# Patient Record
Sex: FEMALE | URBAN - METROPOLITAN AREA
[De-identification: names, ages, dates, MRNs, and addresses within clinical notes are randomized per-mention and may not be internally consistent; named-entity substitution may affect disease eponyms.]

---

## 2017-01-05 PROBLEM — J35.01 CHRONIC TONSILLITIS: Status: ACTIVE | Noted: 2017-01-05

## 2017-01-05 PROBLEM — J34.3 HYPERTROPHY OF BOTH INFERIOR NASAL TURBINATES: Status: ACTIVE | Noted: 2017-01-05

## 2017-01-05 PROBLEM — R09.81 CHRONIC NASAL CONGESTION: Status: ACTIVE | Noted: 2017-01-05

## 2017-01-05 PROBLEM — J34.2 NASAL SEPTAL DEVIATION: Status: ACTIVE | Noted: 2017-01-05

## 2017-01-05 PROBLEM — J34.89 NASAL OBSTRUCTION: Status: ACTIVE | Noted: 2017-01-05

## 2017-01-05 PROBLEM — J32.9 CHRONIC SINUSITIS, UNSPECIFIED LOCATION: Status: ACTIVE | Noted: 2017-01-05

## 2017-01-05 PROBLEM — J30.1 SEASONAL ALLERGIC RHINITIS DUE TO POLLEN: Status: ACTIVE | Noted: 2017-01-05

## 2017-01-13 RX ORDER — NORETHINDRONE ACETATE AND ETHINYL ESTRADIOL AND FERROUS FUMARATE 1MG-20(24)
KIT ORAL
Qty: 28 TABLET | Refills: 11 | Status: SHIPPED | OUTPATIENT
Start: 2017-01-13 | End: 2017-03-06 | Stop reason: ALTCHOICE

## 2017-01-13 RX ORDER — NORETHINDRONE ACETATE AND ETHINYL ESTRADIOL AND FERROUS FUMARATE 1MG-20(24)
KIT ORAL
Qty: 28 TABLET | Refills: 0 | OUTPATIENT
Start: 2017-01-13

## 2017-01-13 NOTE — TELEPHONE ENCOUNTER
Last OV 3/11/16  Last refilled 3/29/16  #28  11 refills  Confirmed with pharmacy patient has no refills left

## 2017-01-13 NOTE — TELEPHONE ENCOUNTER
Last refilled 3/29/16  #28  11 refills    Spoke with [address] staff who states patient has refills available  Will ready for patient

## 2017-01-26 PROBLEM — J32.0 CHRONIC MAXILLARY SINUSITIS: Status: ACTIVE | Noted: 2017-01-26

## 2017-01-26 PROBLEM — J32.2 CHRONIC ETHMOIDAL SINUSITIS: Status: ACTIVE | Noted: 2017-01-26

## 2017-02-16 ENCOUNTER — TELEPHONE (OUTPATIENT)
Dept: FAMILY MEDICINE CLINIC | Facility: CLINIC | Age: 32
End: 2017-02-16

## 2017-02-16 ENCOUNTER — OFFICE VISIT (OUTPATIENT)
Dept: FAMILY MEDICINE CLINIC | Facility: CLINIC | Age: 32
End: 2017-02-16

## 2017-02-16 VITALS
DIASTOLIC BLOOD PRESSURE: 84 MMHG | HEART RATE: 80 BPM | SYSTOLIC BLOOD PRESSURE: 120 MMHG | WEIGHT: 182 LBS | TEMPERATURE: 98 F | BODY MASS INDEX: 28 KG/M2 | RESPIRATION RATE: 16 BRPM

## 2017-02-16 DIAGNOSIS — J40 BRONCHITIS: Primary | ICD-10-CM

## 2017-02-16 DIAGNOSIS — J32.9 CHRONIC SINUSITIS, UNSPECIFIED LOCATION: ICD-10-CM

## 2017-02-16 PROCEDURE — 99214 OFFICE O/P EST MOD 30 MIN: CPT | Performed by: FAMILY MEDICINE

## 2017-02-16 RX ORDER — AZITHROMYCIN 250 MG/1
TABLET, FILM COATED ORAL
Qty: 6 TABLET | Refills: 0 | Status: SHIPPED | OUTPATIENT
Start: 2017-02-16 | End: 2017-02-24 | Stop reason: ALTCHOICE

## 2017-02-16 NOTE — PROGRESS NOTES
Frederick Vega is a 36year old female. Patient presents with:  Sinus Problem: per pt    HPI:   Camryn Bennett presents to the office with complaints of chronic sinus infections, recurrent infections. Has been seeing ENT.  Going for another sinus surgery, but can' SURGERY          Family History   Problem Relation Age of Onset   • Ear Problems Father    • Allergies Father    • Anesthesia Problems  Mother    • Allergies Brother    • Thyroid disease Maternal Grandmother    • Ear Problems Maternal Grandfather    • Canc to return if symptoms persist or worsen.       #[id number]

## 2017-02-24 ENCOUNTER — OFFICE VISIT (OUTPATIENT)
Dept: FAMILY MEDICINE CLINIC | Facility: CLINIC | Age: 32
End: 2017-02-24

## 2017-02-24 VITALS
BODY MASS INDEX: 28 KG/M2 | DIASTOLIC BLOOD PRESSURE: 80 MMHG | SYSTOLIC BLOOD PRESSURE: 114 MMHG | WEIGHT: 184 LBS | RESPIRATION RATE: 16 BRPM | TEMPERATURE: 98 F | HEART RATE: 72 BPM | OXYGEN SATURATION: 98 %

## 2017-02-24 DIAGNOSIS — Z71.3 WEIGHT LOSS COUNSELING, ENCOUNTER FOR: Primary | ICD-10-CM

## 2017-02-24 PROCEDURE — 99214 OFFICE O/P EST MOD 30 MIN: CPT | Performed by: FAMILY MEDICINE

## 2017-02-24 RX ORDER — PHENTERMINE HYDROCHLORIDE 30 MG/1
30 CAPSULE ORAL EVERY MORNING
Qty: 30 CAPSULE | Refills: 1 | Status: SHIPPED
Start: 2017-02-24 | End: 2017-03-06 | Stop reason: SINTOL

## 2017-02-24 NOTE — PROGRESS NOTES
Brittani Joseph is a 36year old female. HPI:   Pt is here to discuss her weight. She reports she is in a better place mentally and physically.  Her confidence is better, her body image is better, she is in a more moderate place, finding exercise that sh REVIEW OF SYSTEMS:   GENERAL HEALTH: feels well otherwise  SKIN: denies any unusual skin lesions or rashes  RESPIRATORY: denies shortness of breath with exertion  CARDIOVASCULAR: denies chest pain on exertion  GI: denies abdominal pain/n/

## 2017-03-05 ENCOUNTER — HOSPITAL ENCOUNTER (OUTPATIENT)
Age: 32
Discharge: HOME | End: 2017-03-05
Payer: COMMERCIAL

## 2017-03-05 VITALS
HEIGHT: 68 IN | RESPIRATION RATE: 16 BRPM | OXYGEN SATURATION: 96 % | WEIGHT: 175 LBS | DIASTOLIC BLOOD PRESSURE: 89 MMHG | BODY MASS INDEX: 26.52 KG/M2 | SYSTOLIC BLOOD PRESSURE: 125 MMHG | TEMPERATURE: 98 F | HEART RATE: 90 BPM

## 2017-03-05 DIAGNOSIS — J32.1 CHRONIC FRONTAL SINUSITIS: ICD-10-CM

## 2017-03-05 DIAGNOSIS — H10.9 CONJUNCTIVITIS OF LEFT EYE, UNSPECIFIED CONJUNCTIVITIS TYPE: Primary | ICD-10-CM

## 2017-03-05 PROCEDURE — 99214 OFFICE O/P EST MOD 30 MIN: CPT

## 2017-03-05 PROCEDURE — 99213 OFFICE O/P EST LOW 20 MIN: CPT

## 2017-03-05 RX ORDER — CLARITHROMYCIN 500 MG/1
500 TABLET, COATED ORAL 2 TIMES DAILY
COMMUNITY
End: 2017-06-12 | Stop reason: ALTCHOICE

## 2017-03-05 RX ORDER — FLUTICASONE PROPIONATE 50 MCG
SPRAY, SUSPENSION (ML) NASAL DAILY
COMMUNITY
End: 2018-12-14 | Stop reason: ALTCHOICE

## 2017-03-05 RX ORDER — ERYTHROMYCIN 5 MG/G
1 OINTMENT OPHTHALMIC EVERY 6 HOURS
Qty: 1 G | Refills: 0 | Status: SHIPPED | OUTPATIENT
Start: 2017-03-05 | End: 2017-03-06 | Stop reason: ALTCHOICE

## 2017-03-05 RX ORDER — GARLIC EXTRACT 500 MG
1 CAPSULE ORAL DAILY
COMMUNITY
End: 2018-12-14 | Stop reason: ALTCHOICE

## 2017-03-05 NOTE — ED PROVIDER NOTES
Patient Seen in: [address] [facility]    History   Patient presents with:  Sinusitis  Eye Visual Problem (opthalmic)    Stated Complaint: sinus and pink eye-discharge    HPI    57-year-old female who presents to the immediate care with complai GRAMS EXT AA QID   Ketorolac Tromethamine 10 MG Oral Tab,  TK 1 T PO Q 6 H PRF PAIN   acyclovir (ZOVIRAX) 5 % External Ointment,  Apply to affected area 5x/day for 5 days prn for cold sore   Montelukast Sodium (SINGULAIR) 10 MG Oral Tab,  1 po 2 hours prio Constitutional: She is oriented to person, place, and time. She appears well-developed and well-nourished. No distress. HENT:   Head: Normocephalic and atraumatic.    Right Ear: External ear normal.   Left Ear: External ear normal.   Nose: Nose normal.

## 2017-03-05 NOTE — ED INITIAL ASSESSMENT (HPI)
Patient has chronic sinusitis and saw her ENT yesterday. They are planning to do surgery to help with this issue. She has been prescribed Biaxin by her ENT which she plans to start today. However, this morning she woke with pink eye.  Her sclera is red to h

## 2017-03-06 RX ORDER — NORETHINDRONE ACETATE AND ETHINYL ESTRADIOL 1; .02 MG/1; MG/1
1 TABLET ORAL DAILY
Qty: 1 PACKAGE | Refills: 11 | Status: SHIPPED | OUTPATIENT
Start: 2017-03-06 | End: 2017-06-12 | Stop reason: ALTCHOICE

## 2017-03-29 ENCOUNTER — PATIENT MESSAGE (OUTPATIENT)
Dept: FAMILY MEDICINE CLINIC | Facility: CLINIC | Age: 32
End: 2017-03-29

## 2017-03-29 RX ORDER — PHENTERMINE HYDROCHLORIDE 37.5 MG/1
37.5 TABLET ORAL
Qty: 30 TABLET | Refills: 1 | Status: SHIPPED
Start: 2017-03-29 | End: 2017-06-12 | Stop reason: ALTCHOICE

## 2017-03-29 NOTE — TELEPHONE ENCOUNTER
From: Humberto Gray  To: Yevette Severin, MD  Sent: 3/29/2017 10:12 AM CDT  Subject: Visit Follow-up Question    Hi Dr. Thaddeus Garvey,   I'm following up regarding the Phentermine. I don't feel like it is working anymore.  I'm so hungry all the time and I'm able to s

## 2017-05-26 RX ORDER — ACYCLOVIR 50 MG/G
OINTMENT TOPICAL
Qty: 15 G | Refills: 3 | Status: SHIPPED | OUTPATIENT
Start: 2017-05-26 | End: 2018-12-14 | Stop reason: ALTCHOICE

## 2017-05-26 RX ORDER — VALACYCLOVIR HYDROCHLORIDE 1 G/1
TABLET, FILM COATED ORAL
Qty: 16 TABLET | Refills: 0 | Status: SHIPPED | OUTPATIENT
Start: 2017-05-26 | End: 2018-12-14 | Stop reason: ALTCHOICE

## 2017-05-26 NOTE — TELEPHONE ENCOUNTER
LOV-2/24/2017  Last refill-   ValACYclovir HCl (VALTREX) 1 G Oral Tab 16 tablet 0 3/30/2016 4/6/2016     Sig :  Take 2 tablets (2,000 mg total) by mouth every 12 (twelve) hours.  X 2 doses as needed for each episode of cold sore start at onset of sx

## 2017-06-12 ENCOUNTER — OFFICE VISIT (OUTPATIENT)
Dept: FAMILY MEDICINE CLINIC | Facility: CLINIC | Age: 32
End: 2017-06-12

## 2017-06-12 VITALS
RESPIRATION RATE: 14 BRPM | BODY MASS INDEX: 27.25 KG/M2 | HEART RATE: 78 BPM | HEIGHT: 68 IN | TEMPERATURE: 100 F | WEIGHT: 179.81 LBS | DIASTOLIC BLOOD PRESSURE: 80 MMHG | SYSTOLIC BLOOD PRESSURE: 120 MMHG

## 2017-06-12 DIAGNOSIS — J32.2 CHRONIC ETHMOIDAL SINUSITIS: ICD-10-CM

## 2017-06-12 DIAGNOSIS — J34.89 NASAL OBSTRUCTION: ICD-10-CM

## 2017-06-12 DIAGNOSIS — J32.0 CHRONIC MAXILLARY SINUSITIS: Primary | ICD-10-CM

## 2017-06-12 DIAGNOSIS — J35.01 CHRONIC TONSILLITIS: ICD-10-CM

## 2017-06-12 DIAGNOSIS — F41.9 ANXIETY: ICD-10-CM

## 2017-06-12 DIAGNOSIS — Z01.818 PRE-OP EVALUATION: ICD-10-CM

## 2017-06-12 DIAGNOSIS — F45.22 BODY DYSMORPHIC DISORDER: ICD-10-CM

## 2017-06-12 DIAGNOSIS — J34.3 HYPERTROPHY OF BOTH INFERIOR NASAL TURBINATES: ICD-10-CM

## 2017-06-12 DIAGNOSIS — J34.2 NASAL SEPTAL DEVIATION: ICD-10-CM

## 2017-06-12 PROCEDURE — 99215 OFFICE O/P EST HI 40 MIN: CPT | Performed by: FAMILY MEDICINE

## 2017-06-12 NOTE — H&P
Chau Morgan is a 29year old female who presents for a pre-operative physical exam. Patient is to have turbinate reduction, deviated septum repair, T&A, Bilateral anterior ethmoidectomy, bilateral revision maxillary antrostomy (right possible left), to induced   • Allergic rhinitis           Past Surgical History    REMOVAL GALLBLADDER      SINUS SURGERY          Family History   Problem Relation Age of Onset   • Ear Problems Father    • Allergies Father    • Anesthesia Problems  Mother    • Allergies Br deferred  PSYCH: tearful discussing her symptoms  MUSCULOSKELETAL: FROM of extremities without defecits  EXTREMITIES: no cyanosis, clubbing or edema  NEURO: Oriented times three,cranial nerves are intact,motor and sensory are grossly intact    ASSESSMENT A

## 2017-06-13 ENCOUNTER — TELEPHONE (OUTPATIENT)
Dept: FAMILY MEDICINE CLINIC | Facility: CLINIC | Age: 32
End: 2017-06-13

## 2017-06-13 NOTE — TELEPHONE ENCOUNTER
----- Message from Hayden Lynch MD sent at 6/12/2017 11:24 PM CDT -----  Regarding: H&P  Please fax H&P to surgeon ENT dr Ricco shipman

## 2017-06-27 PROBLEM — J34.3 HYPERTROPHY OF BOTH INFERIOR NASAL TURBINATES: Status: RESOLVED | Noted: 2017-01-05 | Resolved: 2017-06-27

## 2017-06-30 PROBLEM — J34.89 NASAL OBSTRUCTION: Status: RESOLVED | Noted: 2017-01-05 | Resolved: 2017-06-30

## 2017-06-30 PROBLEM — J32.9 CHRONIC SINUSITIS, UNSPECIFIED LOCATION: Status: RESOLVED | Noted: 2017-01-05 | Resolved: 2017-06-30

## 2017-06-30 PROBLEM — J35.01 CHRONIC TONSILLITIS: Status: RESOLVED | Noted: 2017-01-05 | Resolved: 2017-06-30

## 2017-06-30 PROBLEM — R09.81 CHRONIC NASAL CONGESTION: Status: RESOLVED | Noted: 2017-01-05 | Resolved: 2017-06-30

## 2017-10-11 ENCOUNTER — TELEPHONE (OUTPATIENT)
Dept: FAMILY MEDICINE CLINIC | Facility: CLINIC | Age: 32
End: 2017-10-11

## 2017-10-11 NOTE — TELEPHONE ENCOUNTER
Received 83/64/67 vis fax, duplicate request for medical records. Nothing in system about 1st request. This is regarding disability eligibility. They request entire chart on pt from 2012 to present.  Sent onto [employer] today and made copy and sent to sca

## 2017-11-17 PROBLEM — J30.1 CHRONIC SEASONAL ALLERGIC RHINITIS DUE TO POLLEN: Status: ACTIVE | Noted: 2017-01-05

## 2017-12-08 ENCOUNTER — TELEPHONE (OUTPATIENT)
Dept: FAMILY MEDICINE CLINIC | Facility: CLINIC | Age: 32
End: 2017-12-08

## 2017-12-08 ENCOUNTER — NURSE ONLY (OUTPATIENT)
Dept: FAMILY MEDICINE CLINIC | Facility: CLINIC | Age: 32
End: 2017-12-08

## 2017-12-08 DIAGNOSIS — Z32.01 POSITIVE URINE PREGNANCY TEST: ICD-10-CM

## 2017-12-08 DIAGNOSIS — Z32.01 POSITIVE URINE PREGNANCY TEST: Primary | ICD-10-CM

## 2017-12-08 PROCEDURE — 84702 CHORIONIC GONADOTROPIN TEST: CPT | Performed by: FAMILY MEDICINE

## 2017-12-08 PROCEDURE — 36415 COLL VENOUS BLD VENIPUNCTURE: CPT | Performed by: FAMILY MEDICINE

## 2017-12-08 NOTE — TELEPHONE ENCOUNTER
Patient thinks she is pregnant. Wants to come in today and confirm before telling her . Please call back.

## 2017-12-08 NOTE — PROGRESS NOTES
Mint tube drawn from left arm with 23g butterfly needle x1.  Pt jena well      Pt is very anxious to get this result- she had 2 positive urine pregnancy test this morning  Today would have been the start of her menses- she is already taking a prenatal vitami

## 2017-12-09 ENCOUNTER — TELEPHONE (OUTPATIENT)
Dept: FAMILY MEDICINE CLINIC | Facility: CLINIC | Age: 32
End: 2017-12-09

## 2017-12-09 DIAGNOSIS — Z32.01 POSITIVE PREGNANCY TEST: Primary | ICD-10-CM

## 2017-12-09 NOTE — TELEPHONE ENCOUNTER
Pt called, was wondering if the results of the blood work from yesterday is back?    Please call pt at [phone]

## 2017-12-09 NOTE — TELEPHONE ENCOUNTER
The hcg is mildly elevated at 119. So it is positive, but given it is low I don't know if it's a very early pregnancy or non-viable or ?  Will want to check hcg again Monday to see if it's going up as expected

## 2017-12-09 NOTE — TELEPHONE ENCOUNTER
Patient notified. Patient verbalized understanding.   Appointment booked  Future Appointments  Date Time Provider Dieter Holland   12/11/2017 10:15 AM  [facility] Terri Palomo

## 2017-12-11 ENCOUNTER — NURSE ONLY (OUTPATIENT)
Dept: FAMILY MEDICINE CLINIC | Facility: CLINIC | Age: 32
End: 2017-12-11

## 2017-12-11 ENCOUNTER — TELEPHONE (OUTPATIENT)
Dept: FAMILY MEDICINE CLINIC | Facility: CLINIC | Age: 32
End: 2017-12-11

## 2017-12-11 DIAGNOSIS — Z32.01 POSITIVE PREGNANCY TEST: ICD-10-CM

## 2017-12-11 PROCEDURE — 36415 COLL VENOUS BLD VENIPUNCTURE: CPT | Performed by: FAMILY MEDICINE

## 2017-12-11 PROCEDURE — 84702 CHORIONIC GONADOTROPIN TEST: CPT | Performed by: FAMILY MEDICINE

## 2017-12-12 NOTE — TELEPHONE ENCOUNTER
Called the pt and advised of the test results- she v/u      Notes Recorded by Sam Cramer MD on 12/11/2017 at 9:30 PM CST  Please notify pt of good news, her hcg has increased to 423, a reassuring sign of a viable, early pregnancy. Congratulations!  If n

## 2017-12-12 NOTE — TELEPHONE ENCOUNTER
Message   Received: Today   Message Contents   Antonieta Distance sent to Meredith Gómez, [facility] Nurse   Caller: self (Today, 10:52 AM)             Pt returning nurses call about results. Please call back.

## 2018-03-16 ENCOUNTER — TELEPHONE (OUTPATIENT)
Dept: FAMILY MEDICINE CLINIC | Facility: CLINIC | Age: 33
End: 2018-03-16

## 2018-03-16 NOTE — TELEPHONE ENCOUNTER
I avoid prednisone if at all possible in pregnancy--it's not an absolute contraindication, but I'd prefer she try ibuprofen first (also has some risks in pregnancy) 600mg TID x 3-4 days with food and if it helps go to BID for 3 days then qd x 3days then st

## 2018-03-16 NOTE — TELEPHONE ENCOUNTER
PT CALLED AND ADV THAT SHE IS 18 WEEKS PREGNANT. PT HAS A BICEP AND BURSITIS FLARE UP. NOT ABLE TO GET A HOLD OF OBGYN     WONDERING IF SHE CAN TAKE PREDNISONE.     IF SO CAN SOME BE CALLED IN    Naveen Crowley

## 2018-03-16 NOTE — TELEPHONE ENCOUNTER
Saw chiropractor and apparently this time the flare is much worse. Currently 18 weeks pregnant. Receiving US, manipulation, to start massage therapy Monday. He is telling her she is headed for a frozen shoulder.  The only thing she is taking for pain is tyl

## 2018-10-15 ENCOUNTER — TELEPHONE (OUTPATIENT)
Dept: FAMILY MEDICINE CLINIC | Facility: CLINIC | Age: 33
End: 2018-10-15

## 2018-10-15 NOTE — TELEPHONE ENCOUNTER
Daughter has an appointment with Dr Rowdy Johnston tomorrow at 11:15 am. Mom wants to know if she can get her flu shot tomorrow during her daughter's OV with Dr Rowdy Johnston. Patient has not seen Dr Rowdy Johnston since 06/12/2017.  Please call patient back to advise if she can shecul

## 2018-10-16 ENCOUNTER — IMMUNIZATION (OUTPATIENT)
Dept: FAMILY MEDICINE CLINIC | Facility: CLINIC | Age: 33
End: 2018-10-16
Payer: COMMERCIAL

## 2018-10-16 DIAGNOSIS — Z23 NEED FOR VACCINATION: ICD-10-CM

## 2018-10-16 PROCEDURE — 90686 IIV4 VACC NO PRSV 0.5 ML IM: CPT | Performed by: FAMILY MEDICINE

## 2018-10-16 PROCEDURE — 90471 IMMUNIZATION ADMIN: CPT | Performed by: FAMILY MEDICINE

## 2018-12-14 ENCOUNTER — OFFICE VISIT (OUTPATIENT)
Dept: FAMILY MEDICINE CLINIC | Facility: CLINIC | Age: 33
End: 2018-12-14
Payer: COMMERCIAL

## 2018-12-14 VITALS
TEMPERATURE: 98 F | HEIGHT: 68 IN | HEART RATE: 83 BPM | RESPIRATION RATE: 16 BRPM | BODY MASS INDEX: 30.31 KG/M2 | WEIGHT: 200 LBS | OXYGEN SATURATION: 99 % | DIASTOLIC BLOOD PRESSURE: 84 MMHG | SYSTOLIC BLOOD PRESSURE: 120 MMHG

## 2018-12-14 DIAGNOSIS — E66.9 OBESITY (BMI 30-39.9): ICD-10-CM

## 2018-12-14 DIAGNOSIS — Z71.3 WEIGHT LOSS COUNSELING, ENCOUNTER FOR: Primary | ICD-10-CM

## 2018-12-14 DIAGNOSIS — K59.09 OTHER CONSTIPATION: ICD-10-CM

## 2018-12-14 DIAGNOSIS — F43.23 ADJUSTMENT DISORDER WITH MIXED ANXIETY AND DEPRESSED MOOD: ICD-10-CM

## 2018-12-14 PROCEDURE — 99215 OFFICE O/P EST HI 40 MIN: CPT | Performed by: FAMILY MEDICINE

## 2018-12-14 RX ORDER — NORETHINDRONE ACETATE AND ETHINYL ESTRADIOL, ETHINYL ESTRADIOL AND FERROUS FUMARATE 1MG-10(24)
KIT ORAL
Refills: 4 | COMMUNITY
Start: 2018-12-04 | End: 2019-11-20

## 2018-12-14 RX ORDER — SERTRALINE HYDROCHLORIDE 25 MG/1
25 TABLET, FILM COATED ORAL DAILY
COMMUNITY
Start: 2018-12-12 | End: 2019-04-11 | Stop reason: ALTCHOICE

## 2018-12-14 RX ORDER — PHENTERMINE HYDROCHLORIDE 30 MG/1
30 CAPSULE ORAL EVERY MORNING
Qty: 30 CAPSULE | Refills: 1 | Status: SHIPPED
Start: 2018-12-14 | End: 2019-11-20

## 2018-12-14 NOTE — PROGRESS NOTES
Rosa Trevino is a 34year old female. HPI:   Patient her eto talk about losing weight.     She has been doing the [employer] program with Dr Rubi Quinn for the past few months, has lost 29# total sine giving birthing 4 months ago, 12# since doing the progr well otherwise  SKIN: denies any unusual skin lesions or rashes  RESPIRATORY: denies shortness of breath with exertion  CARDIOVASCULAR: denies chest pain on exertion  GI: denies abdominal pain and denies heartburn  NEURO: denies headaches    EXAM:

## 2018-12-19 ENCOUNTER — TELEPHONE (OUTPATIENT)
Dept: FAMILY MEDICINE CLINIC | Facility: CLINIC | Age: 33
End: 2018-12-19

## 2018-12-21 NOTE — TELEPHONE ENCOUNTER
Fax received from [employer] that the phentermine has been approved 30mg cap approved for 6 months through 6/19/19, REF# [id number]    Called [employer] to advise on the approval- the pt picked this up on 12/17/18- the prior auth did not go into effect until

## 2018-12-23 ENCOUNTER — HOSPITAL ENCOUNTER (OUTPATIENT)
Age: 33
Discharge: HOME | End: 2018-12-23
Payer: COMMERCIAL

## 2018-12-23 VITALS
OXYGEN SATURATION: 99 % | SYSTOLIC BLOOD PRESSURE: 121 MMHG | WEIGHT: 195 LBS | HEART RATE: 96 BPM | DIASTOLIC BLOOD PRESSURE: 83 MMHG | TEMPERATURE: 98 F | BODY MASS INDEX: 29.55 KG/M2 | HEIGHT: 68 IN | RESPIRATION RATE: 18 BRPM

## 2018-12-23 DIAGNOSIS — M54.50 LUMBAR BACK PAIN: Primary | ICD-10-CM

## 2018-12-23 PROCEDURE — 99214 OFFICE O/P EST MOD 30 MIN: CPT

## 2018-12-23 PROCEDURE — 96372 THER/PROPH/DIAG INJ SC/IM: CPT

## 2018-12-23 RX ORDER — KETOROLAC TROMETHAMINE 10 MG/1
10 TABLET, FILM COATED ORAL EVERY 6 HOURS PRN
Qty: 20 TABLET | Refills: 0 | Status: SHIPPED | OUTPATIENT
Start: 2018-12-23 | End: 2018-12-30

## 2018-12-23 RX ORDER — KETOROLAC TROMETHAMINE 30 MG/ML
60 INJECTION, SOLUTION INTRAMUSCULAR; INTRAVENOUS ONCE
Status: COMPLETED | OUTPATIENT
Start: 2018-12-23 | End: 2018-12-23

## 2018-12-23 RX ORDER — CYCLOBENZAPRINE HCL 10 MG
10 TABLET ORAL 3 TIMES DAILY PRN
Qty: 20 TABLET | Refills: 0 | Status: SHIPPED | OUTPATIENT
Start: 2018-12-23 | End: 2018-12-30

## 2018-12-23 NOTE — ED PROVIDER NOTES
Patient Seen in: [address] [facility]    History   Patient presents with:  Back Pain (musculoskeletal)    Stated Complaint: back pain    51-year-old female who presents to the immediate care with lower mid back pain for the past 4 days.   Tuan (36.6 °C)   Temp src Temporal   SpO2 99 %   O2 Device None (Room air)       Current:/83   Pulse 96   Temp 97.8 °F (36.6 °C) (Temporal)   Resp 18   Ht 172.7 cm (5' 8\")   Wt 88.5 kg   LMP 12/14/2018   SpO2 99%   BMI 29.65 kg/m²         Physical Exam Clinical Impression:  Lumbar back pain  (primary encounter diagnosis)    Disposition:  Discharge  12/23/2018 10:30 am    Follow-up:  Víctor Cortez MD  [address] [place] [address] [facility] [address] [phone]              Medications Prescribed:

## 2018-12-23 NOTE — ED INITIAL ASSESSMENT (HPI)
Low back pain for 4 days, woke up with mild low back pain that has progressively worsened. No injury this time. Has past injury 3 years ago with herniated disc, and gets flare ups but its not resolving.  Has 4mo old baby and thinks lifting and bending over

## 2019-01-09 ENCOUNTER — OFFICE VISIT (OUTPATIENT)
Dept: FAMILY MEDICINE CLINIC | Facility: CLINIC | Age: 34
End: 2019-01-09
Payer: COMMERCIAL

## 2019-01-09 VITALS
HEIGHT: 68 IN | HEART RATE: 92 BPM | RESPIRATION RATE: 18 BRPM | TEMPERATURE: 99 F | WEIGHT: 200 LBS | BODY MASS INDEX: 30.31 KG/M2 | SYSTOLIC BLOOD PRESSURE: 116 MMHG | DIASTOLIC BLOOD PRESSURE: 82 MMHG

## 2019-01-09 DIAGNOSIS — F51.02 ADJUSTMENT INSOMNIA: ICD-10-CM

## 2019-01-09 DIAGNOSIS — Z71.3 WEIGHT LOSS COUNSELING, ENCOUNTER FOR: ICD-10-CM

## 2019-01-09 DIAGNOSIS — E66.9 OBESITY (BMI 30-39.9): ICD-10-CM

## 2019-01-09 DIAGNOSIS — Z15.89 HLA B27 (HLA B27 POSITIVE): ICD-10-CM

## 2019-01-09 DIAGNOSIS — F43.23 ADJUSTMENT DISORDER WITH MIXED ANXIETY AND DEPRESSED MOOD: ICD-10-CM

## 2019-01-09 DIAGNOSIS — M51.26 LUMBAR DISC HERNIATION: ICD-10-CM

## 2019-01-09 DIAGNOSIS — G89.29 CHRONIC BILATERAL LOW BACK PAIN WITHOUT SCIATICA: Primary | ICD-10-CM

## 2019-01-09 DIAGNOSIS — M54.50 CHRONIC BILATERAL LOW BACK PAIN WITHOUT SCIATICA: Primary | ICD-10-CM

## 2019-01-09 LAB
BASOPHILS # BLD AUTO: 0.06 X10(3) UL (ref 0–0.1)
BASOPHILS NFR BLD AUTO: 0.8 %
CRP SERPL-MCNC: 0.45 MG/DL (ref ?–1)
EOSINOPHIL # BLD AUTO: 0.22 X10(3) UL (ref 0–0.3)
EOSINOPHIL NFR BLD AUTO: 3.1 %
ERYTHROCYTE [DISTWIDTH] IN BLOOD BY AUTOMATED COUNT: 13.1 % (ref 11.5–16)
HCT VFR BLD AUTO: 43.9 % (ref 34–50)
HGB BLD-MCNC: 14 G/DL (ref 12–16)
IMMATURE GRANULOCYTE COUNT: 0.01 X10(3) UL (ref 0–1)
IMMATURE GRANULOCYTE RATIO %: 0.1 %
LYMPHOCYTES # BLD AUTO: 2.54 X10(3) UL (ref 0.9–4)
LYMPHOCYTES NFR BLD AUTO: 35.7 %
MCH RBC QN AUTO: 27.7 PG (ref 27–33.2)
MCHC RBC AUTO-ENTMCNC: 31.9 G/DL (ref 31–37)
MCV RBC AUTO: 86.8 FL (ref 81–100)
MONOCYTES # BLD AUTO: 0.4 X10(3) UL (ref 0.1–1)
MONOCYTES NFR BLD AUTO: 5.6 %
NEUTROPHIL ABS PRELIM: 3.88 X10 (3) UL (ref 1.3–6.7)
NEUTROPHILS # BLD AUTO: 3.88 X10(3) UL (ref 1.3–6.7)
NEUTROPHILS NFR BLD AUTO: 54.7 %
PLATELET # BLD AUTO: 337 10(3)UL (ref 150–450)
RBC # BLD AUTO: 5.06 X10(6)UL (ref 3.8–5.1)
RED CELL DISTRIBUTION WIDTH-SD: 41 FL (ref 35.1–46.3)
SED RATE-ML: 6 MM/HR (ref 0–25)
WBC # BLD AUTO: 7.1 X10(3) UL (ref 4–13)

## 2019-01-09 PROCEDURE — 99214 OFFICE O/P EST MOD 30 MIN: CPT | Performed by: FAMILY MEDICINE

## 2019-01-09 PROCEDURE — 85025 COMPLETE CBC W/AUTO DIFF WBC: CPT | Performed by: FAMILY MEDICINE

## 2019-01-09 PROCEDURE — 86038 ANTINUCLEAR ANTIBODIES: CPT | Performed by: FAMILY MEDICINE

## 2019-01-09 PROCEDURE — 86812 HLA TYPING A B OR C: CPT | Performed by: FAMILY MEDICINE

## 2019-01-09 PROCEDURE — 36415 COLL VENOUS BLD VENIPUNCTURE: CPT | Performed by: FAMILY MEDICINE

## 2019-01-09 PROCEDURE — 85652 RBC SED RATE AUTOMATED: CPT | Performed by: FAMILY MEDICINE

## 2019-01-09 PROCEDURE — 86140 C-REACTIVE PROTEIN: CPT | Performed by: FAMILY MEDICINE

## 2019-01-09 RX ORDER — CYCLOBENZAPRINE HCL 10 MG
TABLET ORAL
COMMUNITY
End: 2019-11-20

## 2019-01-09 RX ORDER — BUPROPION HYDROCHLORIDE 150 MG/1
TABLET ORAL
Qty: 90 TABLET | Refills: 1 | Status: SHIPPED | OUTPATIENT
Start: 2019-01-09 | End: 2019-11-20

## 2019-01-09 RX ORDER — ALBUTEROL SULFATE 90 UG/1
2 AEROSOL, METERED RESPIRATORY (INHALATION) AS NEEDED
COMMUNITY

## 2019-01-09 RX ORDER — KETOROLAC TROMETHAMINE 10 MG/1
TABLET, FILM COATED ORAL
COMMUNITY
End: 2019-11-20

## 2019-01-09 RX ORDER — BUPROPION HYDROCHLORIDE 150 MG/1
150 TABLET ORAL DAILY
Qty: 30 TABLET | Refills: 1 | Status: SHIPPED | OUTPATIENT
Start: 2019-01-09 | End: 2019-01-09

## 2019-01-09 NOTE — PROGRESS NOTES
Preet Fam is a 34year old female. HPI:   Here to f/u from [facility] and ED visits 12/23 and 12/24.   STarted about a week earlier and had tried some flexeril she had at home (which usally helps) but didn't help, the ndid telemedicine through her insurance and Tablet 24 Hr TAKE 1 TABLET(150 MG) BY MOUTH DAILY Disp: 90 tablet Rfl: 1   Phentermine HCl 30 MG Oral Cap Take 1 capsule (30 mg total) by mouth every morning.  Disp: 30 capsule Rfl: 1        HISTORY:  Past Medical History:   Diagnosis Date   • Allergic rhin SED RATE, WESTERGREN (AUTOMATED); Future  -     C-REACTIVE PROTEIN; Future  -     KEISHA, DIRECT, REFLEX TO 9 ENAS; Future  -     CBC WITH DIFFERENTIAL WITH PLATELET; Future  -     MRI SI JOINTS(CPT=72730); Future  -     MRI SPINE LUMBAR (CPT=72334);  Future

## 2019-01-11 LAB
ANA SCREEN: NEGATIVE
HLA-B27: POSITIVE

## 2019-01-15 ENCOUNTER — TELEPHONE (OUTPATIENT)
Dept: FAMILY MEDICINE CLINIC | Facility: CLINIC | Age: 34
End: 2019-01-15

## 2019-01-15 NOTE — TELEPHONE ENCOUNTER
Ugh, sorry to hear about that. I don't see a reason to put it off, we'll want to get their opinion in near future here.   If she needs to talk to me I can call at end of day
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
## 2019-04-02 ENCOUNTER — OFFICE VISIT (OUTPATIENT)
Dept: FAMILY MEDICINE CLINIC | Facility: CLINIC | Age: 34
End: 2019-04-02
Payer: COMMERCIAL

## 2019-04-02 VITALS
HEART RATE: 95 BPM | RESPIRATION RATE: 16 BRPM | SYSTOLIC BLOOD PRESSURE: 106 MMHG | OXYGEN SATURATION: 97 % | DIASTOLIC BLOOD PRESSURE: 78 MMHG | TEMPERATURE: 98 F

## 2019-04-02 DIAGNOSIS — H00.022 HORDEOLUM INTERNUM OF RIGHT LOWER EYELID: Primary | ICD-10-CM

## 2019-04-02 PROCEDURE — 99213 OFFICE O/P EST LOW 20 MIN: CPT | Performed by: PHYSICIAN ASSISTANT

## 2019-04-02 RX ORDER — CETIRIZINE HYDROCHLORIDE 10 MG/1
10 TABLET ORAL DAILY
COMMUNITY
End: 2022-02-07 | Stop reason: ALTCHOICE

## 2019-04-02 RX ORDER — TOBRAMYCIN 3 MG/ML
SOLUTION/ DROPS OPHTHALMIC
Qty: 5 ML | Refills: 0 | Status: SHIPPED | OUTPATIENT
Start: 2019-04-02 | End: 2019-04-09

## 2019-04-02 NOTE — PROGRESS NOTES
CHIEF COMPLAINT:   Patient presents with:  Swelling: right eyelid swelling x last night. itchiness/pain. no redness/discharge.  slight congestion x this am. no cough/fever      HPI:   Preet Fam is a 30year old female who presents with chief complaint of Problems Father    • Allergies Father    • Anesthesia Problems  Mother    • Allergies Brother    • Thyroid disease Maternal Grandmother    • Ear Problems Maternal Grandfather    • Cancer Maternal Grandfather    • Bleeding Disorders Maternal Grandfather anticipated.    .     Requested Prescriptions     Signed Prescriptions Disp Refills   • Tobramycin Sulfate 0.3 % Ophthalmic Solution 5 mL 0     Si-2 gtts in affected eye q 4 hours while awake x 5-7 days       Risks, benefits, complications and side effe

## 2019-04-09 ENCOUNTER — PATIENT MESSAGE (OUTPATIENT)
Dept: FAMILY MEDICINE CLINIC | Facility: CLINIC | Age: 34
End: 2019-04-09

## 2019-04-10 NOTE — TELEPHONE ENCOUNTER
From: Donte Higginbotham  To: Brittani Franco MD  Sent: 4/9/2019 7:22 PM CDT  Subject: Prescription Question    Hi Dr. Vincent Brar,   I recently was experiencing bad side effects from taking the cymbalta.  I've been on 60mg for a while and was more fatigued, nauseous, re

## 2019-04-11 RX ORDER — DULOXETIN HYDROCHLORIDE 20 MG/1
40 CAPSULE, DELAYED RELEASE ORAL DAILY
Qty: 60 CAPSULE | Refills: 0 | Status: SHIPPED | OUTPATIENT
Start: 2019-04-11 | End: 2019-11-20

## 2019-04-12 RX ORDER — DULOXETIN HYDROCHLORIDE 20 MG/1
CAPSULE, DELAYED RELEASE ORAL
Qty: 180 CAPSULE | Refills: 0 | OUTPATIENT
Start: 2019-04-12

## 2019-04-12 NOTE — TELEPHONE ENCOUNTER
Duloxetine refilled 4/11/19 #60 with 0 refills. This request refused. See patient email from 4/9/19.

## 2019-04-13 ENCOUNTER — PATIENT MESSAGE (OUTPATIENT)
Dept: FAMILY MEDICINE CLINIC | Facility: CLINIC | Age: 34
End: 2019-04-13

## 2019-04-13 NOTE — TELEPHONE ENCOUNTER
From: Chelsey Ellis  To: Yevette Severin, MD  Sent: 4/13/2019 9:06 AM CDT  Subject: Non-Urgent Medical Question    Hi Dr. Mohamud Mcintyre was my first day taking the 40mg of cymbalta.  I tried to take it last night to avoid any side effects and immediately f

## 2019-05-03 ENCOUNTER — PATIENT MESSAGE (OUTPATIENT)
Dept: FAMILY MEDICINE CLINIC | Facility: CLINIC | Age: 34
End: 2019-05-03

## 2019-05-03 DIAGNOSIS — M53.3 SI (SACROILIAC) PAIN: ICD-10-CM

## 2019-05-03 DIAGNOSIS — M54.50 CHRONIC BILATERAL LOW BACK PAIN, UNSPECIFIED WHETHER SCIATICA PRESENT: Primary | ICD-10-CM

## 2019-05-03 DIAGNOSIS — G89.29 CHRONIC BILATERAL LOW BACK PAIN, UNSPECIFIED WHETHER SCIATICA PRESENT: Primary | ICD-10-CM

## 2019-05-03 NOTE — TELEPHONE ENCOUNTER
From: Rosa Trevino  To: Sanket Nicholson MD  Sent: 5/3/2019 1:09 PM CDT  Subject: Referral Request    Hi Dr. Олег Jurado,    I'm still having intense SI joint pain, now into my glutes and low back again.  I'd like to try dry needling to the muscles of the low back a

## 2019-05-15 ENCOUNTER — TELEPHONE (OUTPATIENT)
Dept: FAMILY MEDICINE CLINIC | Facility: CLINIC | Age: 34
End: 2019-05-15

## 2019-05-21 NOTE — TELEPHONE ENCOUNTER
[facility] plan of care received and signed and faxed back to [facility] [phone]

## 2019-05-24 ENCOUNTER — OFFICE VISIT (OUTPATIENT)
Dept: FAMILY MEDICINE CLINIC | Facility: CLINIC | Age: 34
End: 2019-05-24
Payer: COMMERCIAL

## 2019-05-24 VITALS
TEMPERATURE: 99 F | DIASTOLIC BLOOD PRESSURE: 70 MMHG | RESPIRATION RATE: 16 BRPM | HEART RATE: 93 BPM | OXYGEN SATURATION: 98 % | BODY MASS INDEX: 28.79 KG/M2 | WEIGHT: 190 LBS | HEIGHT: 68 IN | SYSTOLIC BLOOD PRESSURE: 122 MMHG

## 2019-05-24 DIAGNOSIS — J00 ACUTE NASOPHARYNGITIS (COMMON COLD): Primary | ICD-10-CM

## 2019-05-24 DIAGNOSIS — J02.9 SORE THROAT: ICD-10-CM

## 2019-05-24 PROCEDURE — 99213 OFFICE O/P EST LOW 20 MIN: CPT | Performed by: PHYSICIAN ASSISTANT

## 2019-05-24 PROCEDURE — 87081 CULTURE SCREEN ONLY: CPT | Performed by: PHYSICIAN ASSISTANT

## 2019-05-24 PROCEDURE — 87880 STREP A ASSAY W/OPTIC: CPT | Performed by: PHYSICIAN ASSISTANT

## 2019-05-24 RX ORDER — NORETHINDRONE ACETATE AND ETHINYL ESTRADIOL AND FERROUS FUMARATE 1MG-20(24)
KIT ORAL
Refills: 3 | COMMUNITY
Start: 2019-05-10 | End: 2021-10-21

## 2019-05-24 NOTE — PROGRESS NOTES
CHIEF COMPLAINT:   Patient presents with:  Sore Throat: congestion/cough x 2 days. no fever        HPI:   Justino Sotelo is a 30year old female presents to clinic with complaint of sore throat. Patient has had 2 days.  Symptoms have been worsening since on Drug use: No       REVIEW OF SYSTEMS:   GENERAL HEALTH: normal appetite  SKIN: Per HPI  HEENT: denies ear pain, See HPI  RESPIRATORY: denies shortness of breath or wheezing  CARDIOVASCULAR: denies chest pain or palpitations   GI: denies vomiting or diarr Follow up with PCP in 3-5 days if not improving, condition worsens, or fever greater than or equal to 100.4 persists for 72 hours. The patient/parent indicates understanding of these issues and agrees to the plan. Patient Instructions     1.   Rapid s · An over-the-counter anesthetic gargle  Use medicine for more relief  Over-the-counter medicine can reduce sore throat symptoms. Ask your pharmacist if you have questions about which medicine to use:  · Ease pain with anesthetic sprays.  Aspirin or an aspi

## 2019-05-24 NOTE — PATIENT INSTRUCTIONS
1.  Rapid strep negative, throat culture pending. We will contact you in 3 days with results (via mychart or phone). 2.  Mouthwash as prescribed for discomfort. Swish, gargle and spit out medication (do not swallow).   Check the temperature of foods and substitute also helps. Remember, never give aspirin to anyone 25 or younger, or if you are already taking blood thinners.   · For sore throats caused by allergies, try antihistamines to block the allergic reaction.   · Remember: unless a sore throat is caus

## 2019-05-26 ENCOUNTER — TELEPHONE (OUTPATIENT)
Dept: FAMILY MEDICINE CLINIC | Facility: CLINIC | Age: 34
End: 2019-05-26

## 2019-05-26 NOTE — TELEPHONE ENCOUNTER
sw patient, informed of neg strep culture.  Pt still has ST, advised OTC medications for pain relief and warm salt water gargles

## 2019-05-31 ENCOUNTER — TELEPHONE (OUTPATIENT)
Dept: FAMILY MEDICINE CLINIC | Facility: CLINIC | Age: 34
End: 2019-05-31

## 2019-06-01 ENCOUNTER — OFFICE VISIT (OUTPATIENT)
Dept: FAMILY MEDICINE CLINIC | Facility: CLINIC | Age: 34
End: 2019-06-01
Payer: COMMERCIAL

## 2019-06-01 VITALS
BODY MASS INDEX: 30 KG/M2 | RESPIRATION RATE: 16 BRPM | SYSTOLIC BLOOD PRESSURE: 114 MMHG | HEART RATE: 86 BPM | TEMPERATURE: 98 F | OXYGEN SATURATION: 99 % | DIASTOLIC BLOOD PRESSURE: 80 MMHG | WEIGHT: 194.38 LBS

## 2019-06-01 DIAGNOSIS — R05.9 COUGH: ICD-10-CM

## 2019-06-01 DIAGNOSIS — R09.81 SINUS CONGESTION: ICD-10-CM

## 2019-06-01 DIAGNOSIS — J02.9 SORE THROAT: Primary | ICD-10-CM

## 2019-06-01 PROCEDURE — 99213 OFFICE O/P EST LOW 20 MIN: CPT | Performed by: FAMILY MEDICINE

## 2019-06-01 PROCEDURE — 86308 HETEROPHILE ANTIBODY SCREEN: CPT | Performed by: FAMILY MEDICINE

## 2019-06-01 RX ORDER — GENTAMICIN SULFATE 3 MG/ML
SOLUTION/ DROPS OPHTHALMIC
COMMUNITY
Start: 2019-05-30 | End: 2022-01-19 | Stop reason: ALTCHOICE

## 2019-06-01 RX ORDER — AMOXICILLIN AND CLAVULANATE POTASSIUM 875; 125 MG/1; MG/1
1 TABLET, FILM COATED ORAL 2 TIMES DAILY
Qty: 20 TABLET | Refills: 0 | Status: SHIPPED | OUTPATIENT
Start: 2019-06-01 | End: 2019-06-11

## 2019-06-01 NOTE — PROGRESS NOTES
HPI:    Patient ID: Imelda Guzmán is a 30year old female. Patient presents with:  Sore Throat: per pt, sore throat      HPI  Patient is here for sore throat, cough and sinus congestion for 1 week.  Was seen at the [facility] last week, had strep culture which w Right arm, Patient Position: Sitting, Cuff Size: large)   Pulse 86   Temp 98 °F (36.7 °C) (Temporal)   Resp 16   Wt 194 lb 6.4 oz   SpO2 99%   BMI 29.56 kg/m²     Allergies:  Diazepam                RASH  Cardizem [Diltiazem*      Valium likely Bacterial sinusitis. Prescribed Augmentin. Advised to use OTC Flonase, 2 sprays each nostril qd for 10 days.  -     MONONUCLEOSIS TEST,SCREEN (IN-HOUSE)  -     Amoxicillin-Pot Clavulanate 875-125 MG Oral Tab;  Take 1 tablet by mouth 2 (two) times rae

## 2019-06-25 ENCOUNTER — TELEPHONE (OUTPATIENT)
Dept: FAMILY MEDICINE CLINIC | Facility: CLINIC | Age: 34
End: 2019-06-25

## 2019-08-28 RX ORDER — VALACYCLOVIR HYDROCHLORIDE 1 G/1
TABLET, FILM COATED ORAL
Qty: 4 TABLET | Refills: 0 | Status: SHIPPED | OUTPATIENT
Start: 2019-08-28 | End: 2020-01-14

## 2019-09-19 ENCOUNTER — TELEPHONE (OUTPATIENT)
Dept: FAMILY MEDICINE CLINIC | Facility: CLINIC | Age: 34
End: 2019-09-19

## 2019-09-19 DIAGNOSIS — Z23 NEED FOR VACCINATION: Primary | ICD-10-CM

## 2019-09-19 NOTE — TELEPHONE ENCOUNTER
Pt is coming in tomorrow for her flu shot. Per [facility] in pt message to get asap.

## 2019-09-20 ENCOUNTER — IMMUNIZATION (OUTPATIENT)
Dept: FAMILY MEDICINE CLINIC | Facility: CLINIC | Age: 34
End: 2019-09-20
Payer: COMMERCIAL

## 2019-09-20 ENCOUNTER — MED REC SCAN ONLY (OUTPATIENT)
Dept: FAMILY MEDICINE CLINIC | Facility: CLINIC | Age: 34
End: 2019-09-20

## 2019-09-20 DIAGNOSIS — Z23 NEED FOR VACCINATION: ICD-10-CM

## 2019-09-20 PROCEDURE — 90471 IMMUNIZATION ADMIN: CPT | Performed by: FAMILY MEDICINE

## 2019-09-20 PROCEDURE — 90686 IIV4 VACC NO PRSV 0.5 ML IM: CPT | Performed by: FAMILY MEDICINE

## 2019-10-10 ENCOUNTER — TELEPHONE (OUTPATIENT)
Dept: FAMILY MEDICINE CLINIC | Facility: CLINIC | Age: 34
End: 2019-10-10

## 2019-10-10 NOTE — TELEPHONE ENCOUNTER
PT IS ON VACATION AND HAS A REALLY BAD SINUS INFECTION AND IS TAKING OVER TRIP.     PT AWARE DR Aldrich Press OUT OF OFFICE BUT WOULD REALLY LIKE SOMETHING CALLED IN    [address]

## 2019-10-10 NOTE — TELEPHONE ENCOUNTER
Per DS, should be seen at [facility] in [place]. Patient notified and verbalized understanding.

## 2019-10-13 ENCOUNTER — HOSPITAL ENCOUNTER (OUTPATIENT)
Age: 34
Discharge: HOME | End: 2019-10-13
Payer: COMMERCIAL

## 2019-10-13 VITALS
DIASTOLIC BLOOD PRESSURE: 81 MMHG | OXYGEN SATURATION: 97 % | HEART RATE: 111 BPM | BODY MASS INDEX: 28.04 KG/M2 | HEIGHT: 68 IN | WEIGHT: 185 LBS | SYSTOLIC BLOOD PRESSURE: 126 MMHG | RESPIRATION RATE: 18 BRPM | TEMPERATURE: 98 F

## 2019-10-13 DIAGNOSIS — J01.01 ACUTE RECURRENT MAXILLARY SINUSITIS: Primary | ICD-10-CM

## 2019-10-13 DIAGNOSIS — B35.4 TINEA CORPORIS: ICD-10-CM

## 2019-10-13 PROCEDURE — 99213 OFFICE O/P EST LOW 20 MIN: CPT

## 2019-10-13 PROCEDURE — 99214 OFFICE O/P EST MOD 30 MIN: CPT

## 2019-10-13 RX ORDER — CLOTRIMAZOLE 1 %
CREAM (GRAM) TOPICAL
Qty: 45 G | Refills: 0 | Status: SHIPPED | OUTPATIENT
Start: 2019-10-13 | End: 2019-11-20

## 2019-10-13 RX ORDER — AMOXICILLIN AND CLAVULANATE POTASSIUM 875; 125 MG/1; MG/1
1 TABLET, FILM COATED ORAL 2 TIMES DAILY
Qty: 20 TABLET | Refills: 0 | Status: SHIPPED | OUTPATIENT
Start: 2019-10-13 | End: 2019-10-23

## 2019-10-13 NOTE — ED INITIAL ASSESSMENT (HPI)
Cough and congestion for over one week, started with sinus pressure and pain and now its worse in chest. Also rash to left side near bra line, not painful or itchy.  Did a telehealth visit and given amoxicillin to start, started on Thursday but not any impr

## 2019-10-13 NOTE — ED PROVIDER NOTES
Patient Seen in: [address] [facility]      History   Patient presents with:  Cough/URI    Stated Complaint: cold-like symptoms    HPI  Patient is a 62-year-old female past medical history of sinus surgery x2 in 2015 and 2017 and history of ast ill-appearing, toxic-appearing or diaphoretic. HENT:      Head:      Jaw: No trismus. Right Ear: Tympanic membrane and external ear normal.      Left Ear: Tympanic membrane and external ear normal.      Nose: Mucosal edema and rhinorrhea present. history of sinus surgery x2. Sinus pressure not responding to amoxicillin over the past 4 days. Advised patient to stop amoxicillin and start Augmentin. Continue to use Flonase.   Ibuprofen, acetaminophen, nasal lavage and warm compresses over sinuses to

## 2019-10-30 ENCOUNTER — TELEPHONE (OUTPATIENT)
Dept: FAMILY MEDICINE CLINIC | Facility: CLINIC | Age: 34
End: 2019-10-30

## 2019-10-30 RX ORDER — DOXYCYCLINE HYCLATE 100 MG/1
100 CAPSULE ORAL 2 TIMES DAILY
Qty: 14 CAPSULE | Refills: 0 | Status: SHIPPED | OUTPATIENT
Start: 2019-10-30 | End: 2019-11-06

## 2019-10-30 NOTE — TELEPHONE ENCOUNTER
Does hse feel sick? Tired, achy, run down? Any fevers/chills? If feeling \"sick\" I'd try another abx (if not breastfeeding doxy 100mg po bid x 7days), if not, could just be allergies. Is hse doing flonase or other nasal steroid spray?   Any decongestant

## 2019-10-30 NOTE — TELEPHONE ENCOUNTER
Pt is still coughing and has congestion- she finished the augmentin ~10/23 but only a little better  She wonders if it is related to changing allergy meds to xyzal.    Discharge from nose is green    Pt is asking for something else for this sinus/cough

## 2019-10-30 NOTE — TELEPHONE ENCOUNTER
Spoke to mom during her dtr's office visit.  Feeling sick again, tired, run down, head pressure, thick drainage, sent doxy, if this doesn't help may refer to ENT and/or mynor

## 2019-11-08 ENCOUNTER — OFFICE VISIT (OUTPATIENT)
Dept: FAMILY MEDICINE CLINIC | Facility: CLINIC | Age: 34
End: 2019-11-08
Payer: COMMERCIAL

## 2019-11-08 ENCOUNTER — TELEPHONE (OUTPATIENT)
Dept: FAMILY MEDICINE CLINIC | Facility: CLINIC | Age: 34
End: 2019-11-08

## 2019-11-08 VITALS
OXYGEN SATURATION: 100 % | RESPIRATION RATE: 18 BRPM | BODY MASS INDEX: 29 KG/M2 | SYSTOLIC BLOOD PRESSURE: 120 MMHG | TEMPERATURE: 99 F | DIASTOLIC BLOOD PRESSURE: 66 MMHG | WEIGHT: 189.81 LBS | HEART RATE: 74 BPM

## 2019-11-08 DIAGNOSIS — Z13.1 DIABETES MELLITUS SCREENING: ICD-10-CM

## 2019-11-08 DIAGNOSIS — J30.9 ALLERGIC SINUSITIS: ICD-10-CM

## 2019-11-08 DIAGNOSIS — R45.86 MOOD CHANGE: ICD-10-CM

## 2019-11-08 DIAGNOSIS — H02.845 SWELLING OF LEFT LOWER EYELID: ICD-10-CM

## 2019-11-08 DIAGNOSIS — H10.13 ALLERGIC CONJUNCTIVITIS OF BOTH EYES: Primary | ICD-10-CM

## 2019-11-08 DIAGNOSIS — Z13.220 LIPID SCREENING: ICD-10-CM

## 2019-11-08 DIAGNOSIS — Z71.3 WEIGHT LOSS COUNSELING, ENCOUNTER FOR: ICD-10-CM

## 2019-11-08 PROCEDURE — 80061 LIPID PANEL: CPT | Performed by: FAMILY MEDICINE

## 2019-11-08 PROCEDURE — 36415 COLL VENOUS BLD VENIPUNCTURE: CPT | Performed by: FAMILY MEDICINE

## 2019-11-08 PROCEDURE — 83036 HEMOGLOBIN GLYCOSYLATED A1C: CPT | Performed by: FAMILY MEDICINE

## 2019-11-08 PROCEDURE — 80050 GENERAL HEALTH PANEL: CPT | Performed by: FAMILY MEDICINE

## 2019-11-08 PROCEDURE — 99214 OFFICE O/P EST MOD 30 MIN: CPT | Performed by: FAMILY MEDICINE

## 2019-11-08 RX ORDER — METHYLPREDNISOLONE 4 MG/1
TABLET ORAL
Qty: 1 KIT | Refills: 0 | Status: SHIPPED | OUTPATIENT
Start: 2019-11-08 | End: 2019-11-20

## 2019-11-08 RX ORDER — OLOPATADINE HYDROCHLORIDE 2 MG/ML
1 SOLUTION/ DROPS OPHTHALMIC DAILY
Qty: 2.5 ML | Refills: 3 | Status: SHIPPED | OUTPATIENT
Start: 2019-11-08 | End: 2020-01-14

## 2019-11-08 NOTE — PROGRESS NOTES
HPI:   Lexx Lynn is a 30year old female who presents for upper respiratory symptoms for 30 days. Started with: sinus symptoms, treated with abx for sinusitis, nose mostly clear sometimes green,but doesn't feel as sick and def not 100%.     Now has: s Hr TAKE 1 TABLET(150 MG) BY MOUTH DAILY 90 tablet 1   • LO LOESTRIN FE 1 MG-10 MCG / 10 MCG Oral Tab TK 1 T PO D  4   • Phentermine HCl 30 MG Oral Cap Take 1 capsule (30 mg total) by mouth every morning.  30 capsule 1      Past Medical History:   Diagnosis exudate  NECK: supple,no adenopathy  LUNGS: clear to auscultation  CARDIO: RRR without murmur; well perfused    ASSESSMENT AND PLAN:   Gabriela Oliver is a 30year old female who presents with  Diagnoses and all orders for this visit:    Allergic conjunctivit Solution; Apply 1 drop to eye daily. Both eyes    call if no better in 3-4 days or if worsening symptoms.   The patient (and/or parents) indicates agreement and understanding

## 2019-11-08 NOTE — TELEPHONE ENCOUNTER
Left message for the pt that I can get her in on   Future Appointments   Date Time Provider Dieter Holland   11/20/2019 10:30 AM Natalia Mclaughlin MD [facility] EMG Starlene Libman     This is after her daughter's appt- advised to call if she can not make this appt
Pt would like to set up an appt for depression and also her follow up for today's appt. Nothing available until 12/2. Pt doesn't want to wait that long. Can we fit her in in the next 2 weeks for her follow up for her eyes and depression?   Please call p
Yes, can put her in witwh her dtr at upcoming visit, thanks
5
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
## 2019-11-08 NOTE — TELEPHONE ENCOUNTER
Spoke with the pt and advised of the need for an appt  She v/u  Offered her the 3pm and she is agreeable  appt scheduled  Future Appointments   Date Time Provider Dieter Holland   11/8/2019  3:00 PM Jacqui Guerrero MD [facility] EMG Shreyas Thao

## 2019-11-08 NOTE — TELEPHONE ENCOUNTER
Spoke with the pt and has been treated for sinusitits and this am noticed some swelling in the eyes. She has swelling around her eyes left is worse then the right.   Has gone down a little but but feels like she was punched in the face    Used a warm com

## 2019-11-20 ENCOUNTER — TELEPHONE (OUTPATIENT)
Dept: FAMILY MEDICINE CLINIC | Facility: CLINIC | Age: 34
End: 2019-11-20

## 2019-11-20 RX ORDER — SERTRALINE HYDROCHLORIDE 25 MG/1
TABLET, FILM COATED ORAL
Qty: 90 TABLET | Refills: 0 | Status: SHIPPED | OUTPATIENT
Start: 2019-11-20 | End: 2020-01-14

## 2019-11-20 NOTE — TELEPHONE ENCOUNTER
Spoke with the pt and advised of the notes from Dr. Joe Bingham- she v/u    Pt tells me that she sent a mychart to Dr. Joe Bingham about the [facility] d/o clinic advised that it was forwarded to Dr. Joe Bingham and we will look to find some recommendations for her- sh

## 2019-11-20 NOTE — PROGRESS NOTES
Raquel Razo is a 30year old female. HPI:   Patient here to follow-up on mood and weight. At our last visit I had asked her to keep a food journal and ask herself a few questions:    If I could only lose weight from a place of self love what would that START AT ONSET 4 tablet 0   • Albuterol Sulfate HFA (VENTOLIN HFA) 108 (90 Base) MCG/ACT Inhalation Aero Soln Inhale 2 puffs into the lungs as needed.           HISTORY:  Past Medical History:   Diagnosis Date   • Allergic rhinitis    • Extrinsic asthma, un therapist; restart sertraline 25mg to try to prevent panic; klonopin for prn use; reviewed risks including sedation and inability to drive on it. I discussed with the patient the use of benzodiazepines, it's sedating nature.  This medication is potentially

## 2019-11-20 NOTE — TELEPHONE ENCOUNTER
Needs sertraline filled to [facility]    Pt states that when she went home she looked and she doesn't have any left from when she was on it before.

## 2019-11-20 NOTE — TELEPHONE ENCOUNTER
Script sent 1 pill daily for 30 days, increase to 2 pills daily thereafter (unless she's happy with ehr symptoms at 1 pill, but usually end up needing 50mg dose or higher)

## 2020-01-14 ENCOUNTER — OFFICE VISIT (OUTPATIENT)
Dept: FAMILY MEDICINE CLINIC | Facility: CLINIC | Age: 35
End: 2020-01-14
Payer: COMMERCIAL

## 2020-01-14 VITALS
TEMPERATURE: 99 F | BODY MASS INDEX: 30 KG/M2 | SYSTOLIC BLOOD PRESSURE: 120 MMHG | HEART RATE: 80 BPM | RESPIRATION RATE: 14 BRPM | WEIGHT: 198.19 LBS | DIASTOLIC BLOOD PRESSURE: 80 MMHG

## 2020-01-14 DIAGNOSIS — Z71.3 WEIGHT LOSS COUNSELING, ENCOUNTER FOR: Primary | ICD-10-CM

## 2020-01-14 PROCEDURE — 99214 OFFICE O/P EST MOD 30 MIN: CPT | Performed by: FAMILY MEDICINE

## 2020-01-14 RX ORDER — METFORMIN HYDROCHLORIDE 500 MG/1
TABLET, EXTENDED RELEASE ORAL
Qty: 270 TABLET | Refills: 0 | OUTPATIENT
Start: 2020-01-14

## 2020-01-14 RX ORDER — METFORMIN HYDROCHLORIDE 500 MG/1
TABLET, EXTENDED RELEASE ORAL
Qty: 120 TABLET | Refills: 1 | Status: SHIPPED | OUTPATIENT
Start: 2020-01-14 | End: 2020-01-27 | Stop reason: SINTOL

## 2020-01-14 RX ORDER — VALACYCLOVIR HYDROCHLORIDE 1 G/1
TABLET, FILM COATED ORAL
Qty: 16 TABLET | Refills: 3 | Status: SHIPPED | OUTPATIENT
Start: 2020-01-14

## 2020-01-14 NOTE — PROGRESS NOTES
Shyla Powers is a 30year old female. HPI:   Patient here to f/u on mood, weight.     She is really working on herself, doing CBT with thearpy, is going well, and joined a group with a registered dietitian (annie briggs) to work on re-working her rela Brother    • Thyroid disease Maternal Grandmother    • Ear Problems Maternal Grandfather    • Cancer Maternal Grandfather    • Bleeding Disorders Maternal Grandfather    • Thyroid disease Maternal Aunt    • Thyroid disease Cousin       Social History    To HOURS FOR 2 DOSES AS NEEDED FOR EACH EPISODE OF COLD SORE.  START AT ONSET  -     metFORMIN HCl  MG Oral Tablet 24 Hr; 1 po qd x 1 week, then 2 po qd x 1 week, then 3 po qd x 1 week, then 4 po qd thereafter         The patient indicates understanding

## 2020-01-23 ENCOUNTER — PATIENT MESSAGE (OUTPATIENT)
Dept: FAMILY MEDICINE CLINIC | Facility: CLINIC | Age: 35
End: 2020-01-23

## 2020-01-24 NOTE — TELEPHONE ENCOUNTER
From: Yo Moreno  To: Miguel Ángel Waller MD  Sent: 1/23/2020 8:50 PM CST  Subject: Visit Follow-up Question    Hi Dr. Adriano Zhang,   I'm now on two pills daily of the metformin and I feel terrible.  The GI upset is causing so much bloating and pressure that it gets q

## 2020-01-27 RX ORDER — PHENTERMINE HYDROCHLORIDE 37.5 MG/1
37.5 CAPSULE ORAL EVERY MORNING
Qty: 30 CAPSULE | Refills: 1 | Status: SHIPPED | OUTPATIENT
Start: 2020-01-27 | End: 2021-12-01

## 2020-01-30 NOTE — Clinical Note
[facility]  [address] [address]   [facility] [place] [place] [address]  [phone]      Denton Lira :2004 MRN:[id number]    2020 Time Session Began: 1720  Time Session Ended: 1800    Session Type:45 Minute Therapy (71871)    Others Present: no    Intervention: Behavioral, Cognitive, Insight, Supportive    Suicide/Homicide/Violence Ideation: No    If Yes, explain:     Current Outpatient Medications   Medication Sig   • adapalene (DIFFERIN) 0.1 % gel Apply topically nightly.     No current facility-administered medications for this visit.        Change in Medication(s) Reported: No  If Yes, explain:     Patient/Family Education Provided: Yes  Patient/Family Displays Understanding: Yes    If No, explain:     Chief complaint in patient's own words: \"It (the divorce) is taking a long time.\"    Progress Note containing chief complaint and symptoms/problems related to the complaint:    (Data/Action/Response/Plan)    D: Pt discussed things being fine going in between his parent's homes, looking forward to mother being able to live in house vs an apartment after the divorce is finalized, not knowing much of what is happening with divorce court, wanting to live with his mother, still in an after school program despite getting A's, B's, and a C+ last quarter, mood fine, father's fault that mother's stressed, feeling like he doesn't belong at father's, parents have to use a court appointed phone and diana to communicate, but father doesn't do it, mother has a different phone for Pt due to father monitoring the other one, playing indoor soccer, having a couple good friends, getting annoyed with sister, especially when trying to do his school work, and worried that the court views him as bad related to him having run away from father's twice previously. Father brought Pt to session, but wasn't involved. Father said he needed to find somewhere to pay a bill here with  [employer], [place] it being after hours.  A: Pt presented friendly, and with fair mood and affect. Normalized his feelings. Discussed and encouraged that he is not at fault for his parent's issues, on assertively communicating and asking related to his school program and what is happening with the divorce, and to go elsewhere, like his room, if being annoyed by sister.  R: Pt hopes the divorce won't take too much longer.  P: Plan to continue psychotherapy, and to develop his Tx plan next session when a parent is available.    Need for Community Resources Assessed: Yes    Resources Needed: No    If Yes, what resources:     Diagnosis: F43.23 Adjustment disorder with mixed anxiety and depressed mood  (primary encounter diagnosis)    Treatment Plan: To be developed.    Discharge Plan: N/A    Next Appointment: Father said he will have mother call to schedule.  Shauna Driver LCSW

## 2020-01-31 ENCOUNTER — TELEPHONE (OUTPATIENT)
Dept: FAMILY MEDICINE CLINIC | Facility: CLINIC | Age: 35
End: 2020-01-31

## 2020-02-03 NOTE — TELEPHONE ENCOUNTER
Faxed appeal to [employer] - letter, last office visit and denial letter from [facility] to [phone]

## 2020-02-03 NOTE — TELEPHONE ENCOUNTER
That's fine with me. And to clarify, she has to have lost 5% of her body weight on her own first to qualify for phentermine?

## 2020-02-03 NOTE — TELEPHONE ENCOUNTER
PA has been denied the pt doesn't meet the criteria-   For a dx of weight loss the pt has to have lost 5% of body weight    Do you wish to change the indication for the medication to appetite suppression?

## 2020-02-03 NOTE — TELEPHONE ENCOUNTER
Called [facility] [phone] to check on this medication    So per Mark Barron @ [facility]  This medication had a previous PA from 12/2018-6/2019 so they consider this a continuation of the medication- that is why they are looking for the 5% weight loss  I explain

## 2020-02-04 NOTE — TELEPHONE ENCOUNTER
ben from [facility] called about the PA for this patient she wants to clarify if the pt had lost 5% of her body weight on this  advised that previously she had lost weight on the med but that was over 2 years ago    She v/u and will send this to the [employer]

## 2020-02-07 NOTE — TELEPHONE ENCOUNTER
Fax from [address] [place] [address] that the phentermine has been approved for 6 months through 8/6/2020    Case # [id number]      Pt notified via [employer]

## 2020-02-28 NOTE — TELEPHONE ENCOUNTER
Patient went to the [facility] on last Friday, Neg for strep. Nothing OTC is helping. Patient still has a really bad ST and can hardly swallow. What else can she do? Or does she need to be seen again?   Or Can she call something in for the ST?
She should probably get checked out again if she is not getting better at all and still that miserable. She might have ear infection vs. Throat or sinus infection.
Spoke with the pt and advised of the need to be seen again- she v/u we scheduled for tomorrow  Future Appointments   Date Time Provider Dieter Holland   6/1/2019  9:00 AM Emmy Casas MD [facility] [place]
Spoke with the pt and she is still c/o sore throat- swollen- she doesn' t have tonsils  Has some congestion, neck pain, some ear pain. Ibuprofen is not helping at all and tylenol  helps the symptoms very minimually.   She states that she feels like she
- - -

## 2020-03-23 ENCOUNTER — TELEPHONE (OUTPATIENT)
Dept: FAMILY MEDICINE CLINIC | Facility: CLINIC | Age: 35
End: 2020-03-23

## 2020-03-23 NOTE — TELEPHONE ENCOUNTER
Pt called, Having a real bad flare up with her right bicep tendonitis and left knee. Please call pt at [phone] to discuss.

## 2020-03-23 NOTE — TELEPHONE ENCOUNTER
Please find out who she ended up seeing for rheumatology and what the final diagnosis was? I don't see those records in here. The  rheumatologist treats autoimmune conditions and flare ups.

## 2020-03-23 NOTE — TELEPHONE ENCOUNTER
Pt states Dr. Sierra Rose for [facility]. Told pt she has Fibro- he put her on Cymbalta and she couldn't tolerate it and discontinued it. She felt it didn't do anything to help back pain. She states she saw another doctor and they told her it is AS. Could not

## 2020-03-23 NOTE — TELEPHONE ENCOUNTER
Mello. So a few things:    1) Need to find out who diagnosed the AS and when covid calms down re-establish care there.   This is not my area of expertise and need specialist to help monitor and manage long term (there are many treatment options they can pr

## 2020-03-23 NOTE — TELEPHONE ENCOUNTER
Patient states she has autoimmune issues and is having a flare up in her left knee and left biceps tendon. Taking diclofenac but it is not helping at all. States she can not bend or straighten knee. Knee is swollen and painful. No redness.     Intense

## 2020-03-23 NOTE — TELEPHONE ENCOUNTER
Patient states there has not been a definitive dx but pain doctor and surgeon think its a possibility. Derm does not. Surgeon recommended fusion but would need to do csect with subsequent deliveries so is doing pain management in the mean time.      Whit

## 2020-03-25 ENCOUNTER — TELEPHONE (OUTPATIENT)
Dept: FAMILY MEDICINE CLINIC | Facility: CLINIC | Age: 35
End: 2020-03-25

## 2020-03-25 RX ORDER — PREDNISONE 10 MG/1
TABLET ORAL
Qty: 24 TABLET | Refills: 0 | Status: SHIPPED | OUTPATIENT
Start: 2020-03-25 | End: 2020-04-16

## 2020-03-25 NOTE — TELEPHONE ENCOUNTER
Spoke with patient. Having \"random\" flares longstanding and typically stim and u/s therapy +/- cold laser at Dr. Negra Caicedo helps, but she can't go right now b/c of covid. Diclofenac and aleve not helping at all during this flare.     Has been flared for

## 2020-03-25 NOTE — TELEPHONE ENCOUNTER
Pt called she is still having pain in her left knee and right bicep. She is getting no relief from medication that she was advised to take. Pt is wanting to know what she should do next?  Pt Is unable to do her normal day to day activities bc of the pain

## 2020-04-14 ENCOUNTER — TELEPHONE (OUTPATIENT)
Dept: FAMILY MEDICINE CLINIC | Facility: CLINIC | Age: 35
End: 2020-04-14

## 2020-04-14 DIAGNOSIS — R52 BODY ACHES: Primary | ICD-10-CM

## 2020-04-14 PROCEDURE — 99212 OFFICE O/P EST SF 10 MIN: CPT | Performed by: FAMILY MEDICINE

## 2020-04-14 NOTE — TELEPHONE ENCOUNTER
Patient started with a fever and horrible horrible body aches. No cough, no short of breath, no diarrhea, vomiting or nausea. Patient tried to do an MD live visit and was told she needs to contact her PCP.  She also said she has been spotting for a few days

## 2020-04-14 NOTE — TELEPHONE ENCOUNTER
Patient states fever started today, 99.5  State she knows its not a true fever but she feels awful. States she has chills and shivering. States she is having pain mostly in her back,  but says she is having pain allover. No joint swelling.     No cough,

## 2020-04-14 NOTE — TELEPHONE ENCOUNTER
Virtual Telephone Check-In    Justino Sotelo verbally consents to a Virtual/Telephone Check-In visit on 04/14/20. Patient understands and accepts financial responsibility for any deductible, co-insurance and/or co-pays associated with this service.     Dur covid  -if severe persistent sob, if worst HA of life, if severe abdominal pain or persistent vomiting develop go to ER; o/w she'll call our office in 2-3 days with an update, sooner if questions/concerns  -otc tyleno/advil type products fine for body ache

## 2020-04-17 ENCOUNTER — TELEPHONE (OUTPATIENT)
Dept: FAMILY MEDICINE CLINIC | Facility: CLINIC | Age: 35
End: 2020-04-17

## 2020-04-17 NOTE — TELEPHONE ENCOUNTER
She was able to [facility] [address] she is negative they are do curbside testing

## 2020-04-17 NOTE — TELEPHONE ENCOUNTER
Agree with advice given. Based on the guidelines [facility] sent out this week her testing would be declined, so no need to bother.   Thanks

## 2020-04-17 NOTE — TELEPHONE ENCOUNTER
Patient states yesterday she did not have a fever and so far none today    CP beneath sternum. Feels it deep in her lungs. States it is not terrible. Used albuterol 2 times yesterday to help open up her lungs and she felt better. Still has zero energy.

## 2021-06-15 ENCOUNTER — TELEPHONE (OUTPATIENT)
Dept: FAMILY MEDICINE CLINIC | Facility: CLINIC | Age: 36
End: 2021-06-15

## 2021-06-15 DIAGNOSIS — Z23 NEED FOR VACCINATION: Primary | ICD-10-CM

## 2021-06-15 NOTE — TELEPHONE ENCOUNTER
Future Appointments   Date Time Provider Dieter Holland   6/17/2021  2:00 PM  [facility] [address] [facility] Flak[place] Mariscal

## 2021-06-15 NOTE — TELEPHONE ENCOUNTER
Order placed, thanks     Please verify that she is 3rd trimester pregnancy--she had Tdap in 2018 and typically don't do again this soon unless pregnant, so congrats if that's the case!

## 2021-06-17 ENCOUNTER — NURSE ONLY (OUTPATIENT)
Dept: FAMILY MEDICINE CLINIC | Facility: CLINIC | Age: 36
End: 2021-06-17
Payer: COMMERCIAL

## 2021-06-17 PROCEDURE — 90715 TDAP VACCINE 7 YRS/> IM: CPT | Performed by: FAMILY MEDICINE

## 2021-06-17 PROCEDURE — 90471 IMMUNIZATION ADMIN: CPT | Performed by: FAMILY MEDICINE

## 2021-06-17 NOTE — PROGRESS NOTES
Here for [place] Tdap vaccine. Tdap given in left deltoid. Patient tolerated well. Patient left in stable condition.

## 2021-08-21 ENCOUNTER — MED REC SCAN ONLY (OUTPATIENT)
Dept: FAMILY MEDICINE CLINIC | Facility: CLINIC | Age: 36
End: 2021-08-21

## 2021-10-21 ENCOUNTER — OFFICE VISIT (OUTPATIENT)
Dept: FAMILY MEDICINE CLINIC | Facility: CLINIC | Age: 36
End: 2021-10-21
Payer: COMMERCIAL

## 2021-10-21 VITALS
BODY MASS INDEX: 31.83 KG/M2 | SYSTOLIC BLOOD PRESSURE: 118 MMHG | WEIGHT: 210 LBS | HEIGHT: 68 IN | RESPIRATION RATE: 16 BRPM | HEART RATE: 69 BPM | OXYGEN SATURATION: 99 % | DIASTOLIC BLOOD PRESSURE: 76 MMHG | TEMPERATURE: 98 F

## 2021-10-21 DIAGNOSIS — F41.0 PANIC ATTACK: ICD-10-CM

## 2021-10-21 PROCEDURE — 84439 ASSAY OF FREE THYROXINE: CPT | Performed by: NURSE PRACTITIONER

## 2021-10-21 PROCEDURE — 82533 TOTAL CORTISOL: CPT | Performed by: NURSE PRACTITIONER

## 2021-10-21 PROCEDURE — 3008F BODY MASS INDEX DOCD: CPT | Performed by: NURSE PRACTITIONER

## 2021-10-21 PROCEDURE — 3078F DIAST BP <80 MM HG: CPT | Performed by: NURSE PRACTITIONER

## 2021-10-21 PROCEDURE — 3074F SYST BP LT 130 MM HG: CPT | Performed by: NURSE PRACTITIONER

## 2021-10-21 PROCEDURE — 99214 OFFICE O/P EST MOD 30 MIN: CPT | Performed by: NURSE PRACTITIONER

## 2021-10-21 PROCEDURE — 80050 GENERAL HEALTH PANEL: CPT | Performed by: NURSE PRACTITIONER

## 2021-10-21 PROCEDURE — 82306 VITAMIN D 25 HYDROXY: CPT | Performed by: NURSE PRACTITIONER

## 2021-10-21 RX ORDER — SERTRALINE HYDROCHLORIDE 25 MG/1
25 TABLET, FILM COATED ORAL DAILY
COMMUNITY
End: 2021-10-21 | Stop reason: DRUGHIGH

## 2021-10-21 RX ORDER — ALPRAZOLAM 0.5 MG/1
0.5 TABLET ORAL DAILY PRN
Qty: 15 TABLET | Refills: 0 | Status: SHIPPED | OUTPATIENT
Start: 2021-10-21

## 2021-10-21 NOTE — PATIENT INSTRUCTIONS
Increase sertraline to 50mg daily   Take alprazolam sparingly, may take a half pill. Keep in safe space. Do not take while operating heavy machinery, drinking alcohol etc.   Follow up with new PCP in 30 days for medication follow up and weight loss.    Labs

## 2021-10-24 NOTE — PROGRESS NOTES
Subjective:   Patient ID: Kayy Finnegan is a 31year old female. Patient presents to clinic today with concerns regarding her weight and mental health. She is approximately 13 weeks postpartum. States she has not gotten her menses back again.   Has wean coming more frequently and often. She denies any thoughts of hurting herself or others.            History/Other:   Review of Systems   Constitutional: Negative. Concerned about weight loss    Respiratory: Negative. Cardiovascular: Negative. Skin:     General: Skin is warm and dry. Neurological:      Mental Status: She is alert and oriented to person, place, and time.          Assessment & Plan:   BMI 30.0-30.9,adult  (primary encounter diagnosis)  Panic attack    Orders Placed This Encount

## 2021-11-24 ENCOUNTER — HOSPITAL ENCOUNTER (OUTPATIENT)
Age: 36
Discharge: HOME | End: 2021-11-24
Payer: COMMERCIAL

## 2021-11-24 ENCOUNTER — APPOINTMENT (OUTPATIENT)
Dept: GENERAL RADIOLOGY | Age: 36
End: 2021-11-24
Attending: NURSE PRACTITIONER
Payer: COMMERCIAL

## 2021-11-24 VITALS
RESPIRATION RATE: 16 BRPM | HEART RATE: 82 BPM | DIASTOLIC BLOOD PRESSURE: 82 MMHG | TEMPERATURE: 98 F | SYSTOLIC BLOOD PRESSURE: 123 MMHG | OXYGEN SATURATION: 98 %

## 2021-11-24 DIAGNOSIS — M77.50 TENDONITIS OF FOOT: ICD-10-CM

## 2021-11-24 DIAGNOSIS — S99.929A FOOT INJURY: Primary | ICD-10-CM

## 2021-11-24 PROCEDURE — 99213 OFFICE O/P EST LOW 20 MIN: CPT | Performed by: NURSE PRACTITIONER

## 2021-11-24 PROCEDURE — 73630 X-RAY EXAM OF FOOT: CPT | Performed by: NURSE PRACTITIONER

## 2021-11-24 RX ORDER — NAPROXEN 500 MG/1
500 TABLET ORAL 2 TIMES DAILY PRN
Qty: 20 TABLET | Refills: 0 | Status: SHIPPED | OUTPATIENT
Start: 2021-11-24 | End: 2021-12-01

## 2021-11-24 NOTE — ED INITIAL ASSESSMENT (HPI)
Pt with c/o left foot pain. Pt states last week was standing up from floor and stepped wrong.   Pain getting worse

## 2021-11-24 NOTE — TELEPHONE ENCOUNTER
LRF 5/26/17    LOV  6/1/19
PT CALLED AND ADV THAT SHE HAS A COLD SORE AND WOULD LIKE TO KNOW IF SOMETHING CAN BE CALLED IN    GENERIC VALTREX    [address]
Please let patient or care giver know or leave message that refills have been sent. Thanks.
Pt advised that med was sent- she v/u
English

## 2021-11-25 NOTE — ED PROVIDER NOTES
Patient Seen in: [facility]      History   Patient presents with:  Leg or Foot Injury    Stated Complaint: left foot pain    Subjective:   29-year-old female presents the IC with complaints of left plantar foot pain x1 week.   Patient dates she SpO2 98%         Physical Exam  GENERAL: well developed, well nourished,in no apparent distress  SKIN: no rashes,no suspicious lesions  HEENT: atraumatic, normocephalic,ears and throat are clear  NECK: supple,no adenopathy,no bruits  LUNGS: clear to ausc am    Follow-up:  Toma Matt MD  [facility] [phone]                Medications Prescribed:  Discharge Medication List as of 11/24/2021 11:31 AM    START taking these medications    naproxen 500 MG Oral

## 2021-12-01 ENCOUNTER — OFFICE VISIT (OUTPATIENT)
Dept: FAMILY MEDICINE CLINIC | Facility: CLINIC | Age: 36
End: 2021-12-01
Payer: COMMERCIAL

## 2021-12-01 VITALS
BODY MASS INDEX: 31 KG/M2 | SYSTOLIC BLOOD PRESSURE: 114 MMHG | WEIGHT: 207.13 LBS | TEMPERATURE: 97 F | DIASTOLIC BLOOD PRESSURE: 74 MMHG | HEART RATE: 84 BPM | RESPIRATION RATE: 16 BRPM | OXYGEN SATURATION: 99 %

## 2021-12-01 DIAGNOSIS — F32.A ANXIETY AND DEPRESSION: ICD-10-CM

## 2021-12-01 DIAGNOSIS — Z71.3 WEIGHT LOSS COUNSELING, ENCOUNTER FOR: ICD-10-CM

## 2021-12-01 DIAGNOSIS — F41.9 ANXIETY AND DEPRESSION: ICD-10-CM

## 2021-12-01 DIAGNOSIS — J45.990 EXERCISE-INDUCED ASTHMA: Primary | ICD-10-CM

## 2021-12-01 DIAGNOSIS — Z87.42: ICD-10-CM

## 2021-12-01 DIAGNOSIS — Z82.49 FHX: SVT (SUPRAVENTRICULAR TACHYCARDIA): ICD-10-CM

## 2021-12-01 DIAGNOSIS — Z86.79 HISTORY OF SUPRAVENTRICULAR TACHYCARDIA: ICD-10-CM

## 2021-12-01 DIAGNOSIS — E66.9 OBESITY (BMI 30-39.9): ICD-10-CM

## 2021-12-01 DIAGNOSIS — K92.1 BLOOD IN STOOL: ICD-10-CM

## 2021-12-01 PROCEDURE — 99214 OFFICE O/P EST MOD 30 MIN: CPT | Performed by: FAMILY MEDICINE

## 2021-12-01 PROCEDURE — 3074F SYST BP LT 130 MM HG: CPT | Performed by: FAMILY MEDICINE

## 2021-12-01 PROCEDURE — 3078F DIAST BP <80 MM HG: CPT | Performed by: FAMILY MEDICINE

## 2021-12-01 RX ORDER — PHENTERMINE HYDROCHLORIDE 15 MG/1
15 CAPSULE ORAL EVERY MORNING
Qty: 30 CAPSULE | Refills: 0 | Status: SHIPPED | OUTPATIENT
Start: 2021-12-01 | End: 2021-12-08

## 2021-12-01 RX ORDER — CHLORAL HYDRATE 500 MG
1 CAPSULE ORAL AS DIRECTED
COMMUNITY

## 2021-12-01 NOTE — PROGRESS NOTES
[facility] Family Medicine Office Note  Chief Complaint:   Patient presents with:  Medication Follow-Up: Sertraline increased from 25mg-50mg. Patient states medication increase is working very well.       HPI:   This is a 31year old female coming • Cancer Maternal Grandfather    • Bleeding Disorders Maternal Grandfather    • Thyroid disease Maternal Aunt    • Thyroid disease Cousin      Allergies:    Diazepam                RASH, HIVES, OTHER (SEE COMMENTS)  Cardizem [Diltiazem*      Valium reviewed. Appears stated age, well groomed.   Physical Exam     GEN: Not in any acute distress, making good conversation, answering appropriately   SKIN: No pallor, no erythema, no cyanosis, warm and dry  Eyes: wnl, normal conjunctiva   HEAD: Normocephalic, verbalized understanding and agreed with the plan. Patient was given the opportunity to ask questions. All questions were addressed, and patient voices no further concerns. Patient is in agreement with plan.  If the patient has any questions, the patient kn

## 2021-12-08 ENCOUNTER — PATIENT MESSAGE (OUTPATIENT)
Dept: FAMILY MEDICINE CLINIC | Facility: CLINIC | Age: 36
End: 2021-12-08

## 2021-12-08 RX ORDER — PHENTERMINE HYDROCHLORIDE 37.5 MG/1
37.5 TABLET ORAL
Qty: 30 TABLET | Refills: 0 | Status: SHIPPED | OUTPATIENT
Start: 2021-12-08 | End: 2022-01-07

## 2021-12-11 ENCOUNTER — PATIENT MESSAGE (OUTPATIENT)
Dept: FAMILY MEDICINE CLINIC | Facility: CLINIC | Age: 36
End: 2021-12-11

## 2021-12-11 NOTE — TELEPHONE ENCOUNTER
[facility] sent to pt regarding - inquiring if pt was able to  Rx  Routing to nurse pool for follow-up message read by pt and for pt response  Placed - Notify me if not read by: 12/18/2021

## 2022-01-19 ENCOUNTER — OFFICE VISIT (OUTPATIENT)
Dept: FAMILY MEDICINE CLINIC | Facility: CLINIC | Age: 37
End: 2022-01-19
Payer: COMMERCIAL

## 2022-01-19 VITALS
HEART RATE: 75 BPM | SYSTOLIC BLOOD PRESSURE: 116 MMHG | RESPIRATION RATE: 18 BRPM | DIASTOLIC BLOOD PRESSURE: 74 MMHG | TEMPERATURE: 98 F | OXYGEN SATURATION: 99 % | WEIGHT: 209.38 LBS | BODY MASS INDEX: 32 KG/M2

## 2022-01-19 DIAGNOSIS — K44.9 HIATAL HERNIA: Primary | ICD-10-CM

## 2022-01-19 DIAGNOSIS — Z90.49 HISTORY OF CHOLECYSTECTOMY: ICD-10-CM

## 2022-01-19 DIAGNOSIS — K21.9 GASTROESOPHAGEAL REFLUX DISEASE, UNSPECIFIED WHETHER ESOPHAGITIS PRESENT: ICD-10-CM

## 2022-01-19 DIAGNOSIS — R10.13 EPIGASTRIC PAIN: ICD-10-CM

## 2022-01-19 DIAGNOSIS — Z09 FOLLOW-UP EXAMINATION: ICD-10-CM

## 2022-01-19 DIAGNOSIS — K76.0 FATTY INFILTRATION OF LIVER: ICD-10-CM

## 2022-01-19 DIAGNOSIS — D72.825 BANDEMIA: ICD-10-CM

## 2022-01-19 PROCEDURE — 3074F SYST BP LT 130 MM HG: CPT | Performed by: FAMILY MEDICINE

## 2022-01-19 PROCEDURE — 99214 OFFICE O/P EST MOD 30 MIN: CPT | Performed by: FAMILY MEDICINE

## 2022-01-19 PROCEDURE — 3078F DIAST BP <80 MM HG: CPT | Performed by: FAMILY MEDICINE

## 2022-01-19 RX ORDER — PANTOPRAZOLE SODIUM 40 MG/1
TABLET, DELAYED RELEASE ORAL
COMMUNITY
Start: 2022-01-18

## 2022-01-19 NOTE — PROGRESS NOTES
[facility] Family Medicine Office Note  Chief Complaint:   Patient presents with:   Follow - Up: Weight loss f/u      HPI:   This is a 31year old female with some extensive issues with her stomach / and hx of cholecystectomy now coming in for fol Grandfather    • Thyroid disease Maternal Aunt    • Thyroid disease Cousin      Allergies:    Diazepam                RASH, HIVES, OTHER (SEE COMMENTS)  Cardizem [Diltiazem*      Valium                    Current Meds:  Current Outpatient Medications   Med Hiatal hernia  - GASTRO - INTERNAL    2. Gastroesophageal reflux disease, unspecified whether esophagitis present  - GASTRO - INTERNAL    3. Epigastric pain  - GASTRO - INTERNAL  Now resolved - pain is not bad at this time.  Discussed dietary modifications education done. Outcome: Patient verbalizes understanding. Patient is notified to call with any questions, complications, allergies, or worsening or changing symptoms.   Patient is to call with any side effects or complications from the treatments as a re

## 2022-01-19 NOTE — PATIENT INSTRUCTIONS
Avoid spicy foods, avoid caffeine, mints, chocolate, alcohol, tobacco, chewing gum. Avoid Ibuprofen, Advil, Motrin, Aleve, Aspirin. May use Tums or Maalox or other antacids for discomfort.     Elevate head of the bed 45°  Eat at least 2-3 hours prior taste in the mouth  · Trouble swallowing  Treating symptoms  If you have been diagnosed with a hiatal hernia, these tips may help ease symptoms:   · Lose excess weight. Extra weight puts pressure on the stomach and esophagus.   · Don’t lie down after eating

## 2022-02-07 ENCOUNTER — OFFICE VISIT (OUTPATIENT)
Dept: FAMILY MEDICINE CLINIC | Facility: CLINIC | Age: 37
End: 2022-02-07
Payer: COMMERCIAL

## 2022-02-07 ENCOUNTER — TELEPHONE (OUTPATIENT)
Dept: FAMILY MEDICINE CLINIC | Facility: CLINIC | Age: 37
End: 2022-02-07

## 2022-02-07 VITALS
HEART RATE: 77 BPM | RESPIRATION RATE: 16 BRPM | DIASTOLIC BLOOD PRESSURE: 84 MMHG | OXYGEN SATURATION: 99 % | WEIGHT: 202.13 LBS | TEMPERATURE: 97 F | SYSTOLIC BLOOD PRESSURE: 120 MMHG | BODY MASS INDEX: 31 KG/M2

## 2022-02-07 DIAGNOSIS — N64.4 BREAST PAIN, LEFT: Primary | ICD-10-CM

## 2022-02-07 DIAGNOSIS — S29.011A MUSCLE STRAIN OF CHEST WALL, INITIAL ENCOUNTER: ICD-10-CM

## 2022-02-07 PROCEDURE — 3074F SYST BP LT 130 MM HG: CPT | Performed by: FAMILY MEDICINE

## 2022-02-07 PROCEDURE — 3079F DIAST BP 80-89 MM HG: CPT | Performed by: FAMILY MEDICINE

## 2022-02-07 PROCEDURE — 99214 OFFICE O/P EST MOD 30 MIN: CPT | Performed by: FAMILY MEDICINE

## 2022-02-07 NOTE — TELEPHONE ENCOUNTER
LAURA gillette/ Dr. Sejal Morgan 02/07/2022 - for left breast pain    US left breast order placed    Mammogram dept requesting for Diagnostic mammogram    Order pending, please review.  Thank you

## 2022-02-07 NOTE — TELEPHONE ENCOUNTER
Discussed with Dr. Rigoberto Hinkle regarding note below - Dr. Rigoberto Hinkle to discuss with mammogram dept    No need for mammogram order at this time

## 2022-02-07 NOTE — TELEPHONE ENCOUNTER
Mammogram department from Hospital called stating pt needs an order for a diagnostic bilateral mammogram     Please place order    Call back # [phone]    Thank you

## 2022-02-07 NOTE — PATIENT INSTRUCTIONS
Pain / fullness in the L breast region. Clinically appears to be a strained pectoralis muscle. Postpartum 6 months and has had some engorgement of the breast tail with this baby. No fever or chills. No nausea or vomiting. Doing well overall and has been working out that could be straining her chest muscles. Ice the area at least 3-5 minutes each time and perform this at least 3 times per day. US of the L breast ordered to rule out any other abnormality.

## 2022-02-09 ENCOUNTER — HOSPITAL ENCOUNTER (OUTPATIENT)
Dept: MAMMOGRAPHY | Facility: HOSPITAL | Age: 37
Discharge: HOME | End: 2022-02-09
Attending: FAMILY MEDICINE
Payer: COMMERCIAL

## 2022-02-09 DIAGNOSIS — S29.011A MUSCLE STRAIN OF CHEST WALL, INITIAL ENCOUNTER: ICD-10-CM

## 2022-02-09 DIAGNOSIS — N64.4 BREAST PAIN, LEFT: ICD-10-CM

## 2022-02-09 PROCEDURE — 76642 ULTRASOUND BREAST LIMITED: CPT | Performed by: FAMILY MEDICINE

## 2022-03-09 NOTE — TELEPHONE ENCOUNTER
Last refilled 12/13/21 for #90 with 0 RF  LOV with MM 2/7/22  No future appt with pcp  Protocol: none

## 2022-03-25 ENCOUNTER — HOSPITAL ENCOUNTER (OUTPATIENT)
Age: 37
Discharge: HOME | End: 2022-03-25
Payer: COMMERCIAL

## 2022-03-25 VITALS
DIASTOLIC BLOOD PRESSURE: 102 MMHG | RESPIRATION RATE: 14 BRPM | TEMPERATURE: 98 F | SYSTOLIC BLOOD PRESSURE: 127 MMHG | HEART RATE: 96 BPM | OXYGEN SATURATION: 99 %

## 2022-03-25 DIAGNOSIS — J06.9 VIRAL URI: Primary | ICD-10-CM

## 2022-03-25 LAB — S PYO AG THROAT QL: NEGATIVE

## 2022-03-25 PROCEDURE — 87880 STREP A ASSAY W/OPTIC: CPT | Performed by: NURSE PRACTITIONER

## 2022-03-25 PROCEDURE — 99213 OFFICE O/P EST LOW 20 MIN: CPT | Performed by: NURSE PRACTITIONER

## 2022-06-08 NOTE — TELEPHONE ENCOUNTER
LOV 02/07/2022  Last refill on 03/09/2022, for #90 tabs, with 0 refills  SERTRALINE 50 MG Oral Tab    No future appointments. Order(s) pending, please review. Thank you.

## 2022-09-07 NOTE — TELEPHONE ENCOUNTER
Routing to provider per protocol. SERTRALINE 50 MG Oral Tab  Last refilled on 6/8/22 for #90  with 0 rf. Last labs 1/18/22. Last seen on 2/7/22. No future appointments. Thank you.

## 2022-09-30 ENCOUNTER — TELEPHONE (OUTPATIENT)
Dept: FAMILY MEDICINE CLINIC | Facility: CLINIC | Age: 37
End: 2022-09-30

## 2022-09-30 NOTE — TELEPHONE ENCOUNTER
Pt was a pt of [address] and is now a pt with MM. Pt has 2 daughters Giovanni Mueller tino 8/15/18 and Jennifer Mago 2021. Pt would like to know if MM would be willing to take her daughters on as pts as well. Both were pt's of [address]. Please advise.   Thank you

## 2022-10-04 ENCOUNTER — HOSPITAL ENCOUNTER (OUTPATIENT)
Age: 37
Discharge: HOME | End: 2022-10-04
Payer: COMMERCIAL

## 2022-10-04 VITALS
BODY MASS INDEX: 28.34 KG/M2 | DIASTOLIC BLOOD PRESSURE: 77 MMHG | HEIGHT: 68 IN | HEART RATE: 85 BPM | SYSTOLIC BLOOD PRESSURE: 112 MMHG | RESPIRATION RATE: 18 BRPM | OXYGEN SATURATION: 98 % | WEIGHT: 187 LBS | TEMPERATURE: 98 F

## 2022-10-04 DIAGNOSIS — Z20.822 ENCOUNTER FOR LABORATORY TESTING FOR COVID-19 VIRUS: Primary | ICD-10-CM

## 2022-10-04 DIAGNOSIS — U07.1 COVID-19: ICD-10-CM

## 2022-10-04 LAB — SARS-COV-2 RNA RESP QL NAA+PROBE: DETECTED

## 2022-10-04 PROCEDURE — 99213 OFFICE O/P EST LOW 20 MIN: CPT | Performed by: NURSE PRACTITIONER

## 2022-10-04 PROCEDURE — U0002 COVID-19 LAB TEST NON-CDC: HCPCS | Performed by: NURSE PRACTITIONER

## 2022-10-04 RX ORDER — NIRMATRELVIR AND RITONAVIR 300-100 MG
1 KIT ORAL 2 TIMES DAILY
Qty: 1 EACH | Refills: 0 | Status: SHIPPED | OUTPATIENT
Start: 2022-10-04

## 2022-10-04 RX ORDER — ALBUTEROL SULFATE 90 UG/1
2 AEROSOL, METERED RESPIRATORY (INHALATION) AS NEEDED
Qty: 1 EACH | Refills: 0 | Status: SHIPPED | OUTPATIENT
Start: 2022-10-04

## 2022-10-04 RX ORDER — AMOXICILLIN 875 MG/1
875 TABLET, COATED ORAL 2 TIMES DAILY
COMMUNITY

## 2022-10-04 NOTE — ED INITIAL ASSESSMENT (HPI)
Pt sts cough, pain to back of neck, HA, body aches, chills for the past 2-3 days.
The patient is a 78y Female complaining of throat, pain.

## 2022-10-10 ENCOUNTER — TELEPHONE (OUTPATIENT)
Dept: FAMILY MEDICINE CLINIC | Facility: CLINIC | Age: 37
End: 2022-10-10

## 2022-10-10 NOTE — TELEPHONE ENCOUNTER
Tested positive for Covid at urgent care on 10/4    Nasal congestion  Cough  No fever  No shortness of breath    Patient not taking anything otc to help with symptoms    Wants to know what she can take and if a steroid might help    Please adv    Thank you

## 2022-10-10 NOTE — TELEPHONE ENCOUNTER
Young and healthy, and vaccinated. Agree with recommendation. Steroids not indicated. If any shortness of breath / difficulty breathing / wheezing or low saturation GO TO THE ER.

## 2022-10-10 NOTE — TELEPHONE ENCOUNTER
[facility] sent to pt - home supportive care recommendations  Notify me if not read by 10/12/22

## 2022-11-17 ENCOUNTER — PATIENT MESSAGE (OUTPATIENT)
Dept: FAMILY MEDICINE CLINIC | Facility: CLINIC | Age: 37
End: 2022-11-17

## 2022-11-18 RX ORDER — VALACYCLOVIR HYDROCHLORIDE 1 G/1
TABLET, FILM COATED ORAL
Qty: 16 TABLET | Refills: 3 | Status: SHIPPED | OUTPATIENT
Start: 2022-11-18

## 2022-11-18 NOTE — TELEPHONE ENCOUNTER
LOV 02/07/2022    Last refill on 01/14/2022, for #16  tabs, with 3 refills  valACYclovir HCl 1 G Oral Tab  (by Dr. Wong Castillo)    No future appointments. Order(s) pending, please review. Thank you.

## 2022-11-28 ENCOUNTER — HOSPITAL ENCOUNTER (OUTPATIENT)
Age: 37
Discharge: HOME | End: 2022-11-28
Payer: COMMERCIAL

## 2022-11-28 VITALS
SYSTOLIC BLOOD PRESSURE: 117 MMHG | RESPIRATION RATE: 18 BRPM | TEMPERATURE: 98 F | DIASTOLIC BLOOD PRESSURE: 73 MMHG | HEIGHT: 68 IN | BODY MASS INDEX: 28.04 KG/M2 | OXYGEN SATURATION: 99 % | HEART RATE: 85 BPM | WEIGHT: 185 LBS

## 2022-11-28 DIAGNOSIS — J02.9 SORE THROAT: ICD-10-CM

## 2022-11-28 DIAGNOSIS — J01.00 ACUTE MAXILLARY SINUSITIS, RECURRENCE NOT SPECIFIED: Primary | ICD-10-CM

## 2022-11-28 LAB
POCT MONO: NEGATIVE
S PYO AG THROAT QL: NEGATIVE

## 2022-11-28 PROCEDURE — 86308 HETEROPHILE ANTIBODY SCREEN: CPT | Performed by: NURSE PRACTITIONER

## 2022-11-28 PROCEDURE — 99213 OFFICE O/P EST LOW 20 MIN: CPT | Performed by: NURSE PRACTITIONER

## 2022-11-28 PROCEDURE — 87880 STREP A ASSAY W/OPTIC: CPT | Performed by: NURSE PRACTITIONER

## 2022-11-28 RX ORDER — AMOXICILLIN AND CLAVULANATE POTASSIUM 875; 125 MG/1; MG/1
1 TABLET, FILM COATED ORAL 2 TIMES DAILY
Qty: 20 TABLET | Refills: 0 | Status: SHIPPED | OUTPATIENT
Start: 2022-11-28 | End: 2022-12-08

## 2022-12-02 NOTE — TELEPHONE ENCOUNTER
LOV 02/07/2022    Last refill on 09/07/22, for #90 tabs, with 0 refills  SERTRALINE 50 MG Oral Tab    No future appointments. Order(s) pending, please review. Thank you.

## 2022-12-12 RX ORDER — PHENTERMINE HYDROCHLORIDE 37.5 MG/1
TABLET ORAL
Qty: 30 TABLET | Refills: 2 | Status: SHIPPED | OUTPATIENT
Start: 2022-12-12 | End: 2023-01-12

## 2022-12-15 ENCOUNTER — PATIENT MESSAGE (OUTPATIENT)
Dept: FAMILY MEDICINE CLINIC | Facility: CLINIC | Age: 37
End: 2022-12-15

## 2022-12-15 DIAGNOSIS — N64.4 BREAST PAIN, LEFT: Primary | ICD-10-CM

## 2022-12-15 DIAGNOSIS — N64.4 BREAST PAIN, RIGHT: ICD-10-CM

## 2022-12-15 NOTE — TELEPHONE ENCOUNTER
02/07/22 - LOV - left breast pain  02/09/22 - US left breast completed    Please see pt's [facility] - pt c/o right breast pain/tenderness  [facility] sent to pt for more information  Pt to respond    Please advise, thank you

## 2022-12-16 NOTE — TELEPHONE ENCOUNTER
Gyne referral order placed  [facility] sent to pt  Notify me if not read by 12/23/22

## 2022-12-20 ENCOUNTER — OFFICE VISIT (OUTPATIENT)
Dept: OBGYN CLINIC | Facility: CLINIC | Age: 37
End: 2022-12-20
Payer: COMMERCIAL

## 2022-12-20 VITALS
SYSTOLIC BLOOD PRESSURE: 120 MMHG | HEART RATE: 78 BPM | HEIGHT: 68.25 IN | WEIGHT: 190 LBS | BODY MASS INDEX: 28.79 KG/M2 | DIASTOLIC BLOOD PRESSURE: 78 MMHG

## 2022-12-20 DIAGNOSIS — N64.4 PAIN OF BOTH BREASTS: Primary | ICD-10-CM

## 2022-12-20 DIAGNOSIS — N39.3 STRESS INCONTINENCE: ICD-10-CM

## 2022-12-20 DIAGNOSIS — M62.08 DIASTASIS OF RECTUS ABDOMINIS: ICD-10-CM

## 2022-12-20 PROCEDURE — 3078F DIAST BP <80 MM HG: CPT | Performed by: NURSE PRACTITIONER

## 2022-12-20 PROCEDURE — 99213 OFFICE O/P EST LOW 20 MIN: CPT | Performed by: NURSE PRACTITIONER

## 2022-12-20 PROCEDURE — 3074F SYST BP LT 130 MM HG: CPT | Performed by: NURSE PRACTITIONER

## 2022-12-20 PROCEDURE — 3008F BODY MASS INDEX DOCD: CPT | Performed by: NURSE PRACTITIONER

## 2022-12-21 ENCOUNTER — HOSPITAL ENCOUNTER (OUTPATIENT)
Dept: MAMMOGRAPHY | Age: 37
Discharge: HOME | End: 2022-12-21
Attending: NURSE PRACTITIONER
Payer: COMMERCIAL

## 2022-12-21 ENCOUNTER — TELEPHONE (OUTPATIENT)
Dept: MAMMOGRAPHY | Facility: HOSPITAL | Age: 37
End: 2022-12-21

## 2022-12-21 ENCOUNTER — HOSPITAL ENCOUNTER (OUTPATIENT)
Dept: ULTRASOUND IMAGING | Age: 37
Discharge: HOME | End: 2022-12-21
Attending: NURSE PRACTITIONER
Payer: COMMERCIAL

## 2022-12-21 DIAGNOSIS — N64.4 PAIN OF BOTH BREASTS: ICD-10-CM

## 2022-12-21 PROCEDURE — 77066 DX MAMMO INCL CAD BI: CPT | Performed by: NURSE PRACTITIONER

## 2022-12-21 PROCEDURE — 76642 ULTRASOUND BREAST LIMITED: CPT | Performed by: NURSE PRACTITIONER

## 2022-12-21 PROCEDURE — 77062 BREAST TOMOSYNTHESIS BI: CPT | Performed by: NURSE PRACTITIONER

## 2022-12-21 NOTE — TELEPHONE ENCOUNTER
This Breast Care RN phoned pt re right breast 1 site ultrasound guided biopsy recommendation by Dr. Martinez Arriaga for lesion. Procedure reviewed and all questions answered. Emotional support given. Confirmed pt did receive educational handouts and reviewed these with the patient. On the day of the biopsy, pt instructed to take Tylenol 1000mg PO, eat a light meal & bring or wear a sports bra. Post biopsy care also reviewed with pt to include NO lifting more than 5lbs, no exercising or housework (limit upper body movement) for 24-48 hrs post biopsy. Patient denies blood thinners, bleeding disorders, liver disease, and chemo. Pt verbalized understanding. Our breast center schedulers will be calling to schedule an appt that is convenient for pt.

## 2022-12-28 ENCOUNTER — HOSPITAL ENCOUNTER (OUTPATIENT)
Dept: MAMMOGRAPHY | Facility: HOSPITAL | Age: 37
Discharge: HOME | End: 2022-12-28
Attending: NURSE PRACTITIONER
Payer: COMMERCIAL

## 2022-12-28 DIAGNOSIS — N63.0 BREAST NODULE: ICD-10-CM

## 2022-12-28 PROCEDURE — 88305 TISSUE EXAM BY PATHOLOGIST: CPT | Performed by: NURSE PRACTITIONER

## 2022-12-28 PROCEDURE — 19083 BX BREAST 1ST LESION US IMAG: CPT | Performed by: NURSE PRACTITIONER

## 2022-12-28 PROCEDURE — 77065 DX MAMMO INCL CAD UNI: CPT | Performed by: NURSE PRACTITIONER

## 2022-12-29 ENCOUNTER — TELEMEDICINE (OUTPATIENT)
Dept: FAMILY MEDICINE CLINIC | Facility: CLINIC | Age: 37
End: 2022-12-29

## 2022-12-29 DIAGNOSIS — D24.1 FIBROADENOMA OF BREAST, RIGHT: ICD-10-CM

## 2022-12-29 DIAGNOSIS — Z71.3 WEIGHT LOSS COUNSELING, ENCOUNTER FOR: ICD-10-CM

## 2022-12-29 DIAGNOSIS — E66.3 OVERWEIGHT (BMI 25.0-29.9): ICD-10-CM

## 2022-12-29 PROCEDURE — 99213 OFFICE O/P EST LOW 20 MIN: CPT | Performed by: FAMILY MEDICINE

## 2022-12-29 RX ORDER — TOPIRAMATE 25 MG/1
25 TABLET ORAL NIGHTLY
Qty: 30 TABLET | Refills: 0 | Status: SHIPPED | OUTPATIENT
Start: 2022-12-29 | End: 2023-01-28

## 2022-12-29 NOTE — IMAGING NOTE
32 61 16: Spoke with Dorina Ortega post ultrasound guided right breast biopsy  Introduced myself as breast care coordinator. Name and date of birth verified by Ms. Elise Cisse. Reinforced post biopsy care and instruction. Dorina Ortega denies any issues with biopsy site- bleeding, drainage, redness, tenderness. Pathology results and recommendations discussed as follows:   Final Diagnosis:   Right breast mass 11:00, 2-3 cm from nipple, ultrasound-guided 12-gauge needle core biopsies:  -Portions of fibroadenoma in needle core biopsies, up to 8 mm. -Background fibrocystic changes with mammary stromal fibrosis and focal adenosis. Electronically signed by Juan Uribe MD on 12/29/2022 at 1424     Impression:  Pathology is reported as benign, with findings concordant with the imaging assessment. Clinical follow-up and management recommended for the patient's breast pain. If there is no change in the clinical breast exam, recommend annual screening mammography starting at age 36 or sooner if clinically indicated. Dictated by (CST): Dusty Chan MD on 12/29/2022 at 2:32 PM       Finalized by (CST): Dusty Chan MD on 12/29/2022 at 2:34 PM         Dorina Ortega verbalized understanding and agreement to the above. Ms. Elise Cisse instructed to perform breast self-exams and notify physician of any changes/concerns. Ms. Elise Cisse verbalized agreement.

## 2022-12-30 ENCOUNTER — PATIENT MESSAGE (OUTPATIENT)
Dept: FAMILY MEDICINE CLINIC | Facility: CLINIC | Age: 37
End: 2022-12-30

## 2022-12-30 RX ORDER — TOPIRAMATE 25 MG/1
TABLET ORAL
Qty: 90 TABLET | Refills: 0 | OUTPATIENT
Start: 2022-12-30

## 2023-01-09 ENCOUNTER — TELEPHONE (OUTPATIENT)
Dept: PHYSICAL THERAPY | Facility: HOSPITAL | Age: 38
End: 2023-01-09

## 2023-01-10 ENCOUNTER — OFFICE VISIT (OUTPATIENT)
Dept: PHYSICAL THERAPY | Facility: HOSPITAL | Age: 38
End: 2023-01-10
Attending: NURSE PRACTITIONER
Payer: COMMERCIAL

## 2023-01-10 DIAGNOSIS — M62.08 DIASTASIS OF RECTUS ABDOMINIS: ICD-10-CM

## 2023-01-10 DIAGNOSIS — N39.3 STRESS INCONTINENCE: ICD-10-CM

## 2023-01-10 PROCEDURE — 97535 SELF CARE MNGMENT TRAINING: CPT

## 2023-01-10 PROCEDURE — 97162 PT EVAL MOD COMPLEX 30 MIN: CPT

## 2023-01-10 PROCEDURE — 97112 NEUROMUSCULAR REEDUCATION: CPT

## 2023-01-17 ENCOUNTER — OFFICE VISIT (OUTPATIENT)
Dept: PHYSICAL THERAPY | Facility: HOSPITAL | Age: 38
End: 2023-01-17
Attending: NURSE PRACTITIONER
Payer: COMMERCIAL

## 2023-01-17 PROCEDURE — 97112 NEUROMUSCULAR REEDUCATION: CPT

## 2023-01-17 PROCEDURE — 97535 SELF CARE MNGMENT TRAINING: CPT

## 2023-01-24 ENCOUNTER — OFFICE VISIT (OUTPATIENT)
Dept: PHYSICAL THERAPY | Facility: HOSPITAL | Age: 38
End: 2023-01-24
Attending: NURSE PRACTITIONER
Payer: COMMERCIAL

## 2023-01-24 PROCEDURE — 97140 MANUAL THERAPY 1/> REGIONS: CPT

## 2023-01-31 ENCOUNTER — OFFICE VISIT (OUTPATIENT)
Dept: PHYSICAL THERAPY | Facility: HOSPITAL | Age: 38
End: 2023-01-31
Attending: NURSE PRACTITIONER
Payer: COMMERCIAL

## 2023-01-31 PROCEDURE — 97112 NEUROMUSCULAR REEDUCATION: CPT

## 2023-01-31 PROCEDURE — 97140 MANUAL THERAPY 1/> REGIONS: CPT

## 2023-01-31 PROCEDURE — 97535 SELF CARE MNGMENT TRAINING: CPT

## 2023-02-07 ENCOUNTER — APPOINTMENT (OUTPATIENT)
Dept: PHYSICAL THERAPY | Facility: HOSPITAL | Age: 38
End: 2023-02-07
Attending: NURSE PRACTITIONER
Payer: COMMERCIAL

## 2023-02-14 ENCOUNTER — PATIENT MESSAGE (OUTPATIENT)
Dept: FAMILY MEDICINE CLINIC | Facility: CLINIC | Age: 38
End: 2023-02-14

## 2023-02-14 ENCOUNTER — OFFICE VISIT (OUTPATIENT)
Dept: FAMILY MEDICINE CLINIC | Facility: CLINIC | Age: 38
End: 2023-02-14
Payer: COMMERCIAL

## 2023-02-14 ENCOUNTER — APPOINTMENT (OUTPATIENT)
Dept: PHYSICAL THERAPY | Facility: HOSPITAL | Age: 38
End: 2023-02-14
Attending: NURSE PRACTITIONER
Payer: COMMERCIAL

## 2023-02-14 VITALS
OXYGEN SATURATION: 98 % | SYSTOLIC BLOOD PRESSURE: 110 MMHG | DIASTOLIC BLOOD PRESSURE: 78 MMHG | HEIGHT: 68 IN | HEART RATE: 83 BPM | WEIGHT: 189 LBS | TEMPERATURE: 97 F | BODY MASS INDEX: 28.64 KG/M2 | RESPIRATION RATE: 16 BRPM

## 2023-02-14 DIAGNOSIS — E66.3 OVERWEIGHT (BMI 25.0-29.9): Primary | ICD-10-CM

## 2023-02-14 DIAGNOSIS — K21.9 GASTROESOPHAGEAL REFLUX DISEASE, UNSPECIFIED WHETHER ESOPHAGITIS PRESENT: ICD-10-CM

## 2023-02-14 DIAGNOSIS — Z13.220 SCREENING CHOLESTEROL LEVEL: ICD-10-CM

## 2023-02-14 DIAGNOSIS — Z13.1 DIABETES MELLITUS SCREENING: ICD-10-CM

## 2023-02-14 PROBLEM — K44.9 HIATAL HERNIA: Status: ACTIVE | Noted: 2022-03-29

## 2023-02-14 LAB
ALBUMIN SERPL-MCNC: 4 G/DL (ref 3.4–5)
ALBUMIN/GLOB SERPL: 1.2 {RATIO} (ref 1–2)
ALP LIVER SERPL-CCNC: 72 U/L
ALT SERPL-CCNC: 21 U/L
ANION GAP SERPL CALC-SCNC: 3 MMOL/L (ref 0–18)
AST SERPL-CCNC: 14 U/L (ref 15–37)
BASOPHILS # BLD AUTO: 0.04 X10(3) UL (ref 0–0.2)
BASOPHILS NFR BLD AUTO: 0.6 %
BILIRUB SERPL-MCNC: 0.4 MG/DL (ref 0.1–2)
BUN BLD-MCNC: 12 MG/DL (ref 7–18)
CALCIUM BLD-MCNC: 9.1 MG/DL (ref 8.5–10.1)
CHLORIDE SERPL-SCNC: 107 MMOL/L (ref 98–112)
CHOLEST SERPL-MCNC: 130 MG/DL (ref ?–200)
CO2 SERPL-SCNC: 27 MMOL/L (ref 21–32)
CREAT BLD-MCNC: 0.65 MG/DL
EOSINOPHIL # BLD AUTO: 0.19 X10(3) UL (ref 0–0.7)
EOSINOPHIL NFR BLD AUTO: 3 %
ERYTHROCYTE [DISTWIDTH] IN BLOOD BY AUTOMATED COUNT: 13.1 %
EST. AVERAGE GLUCOSE BLD GHB EST-MCNC: 91 MG/DL (ref 68–126)
FASTING PATIENT LIPID ANSWER: YES
FASTING STATUS PATIENT QL REPORTED: YES
GFR SERPLBLD BASED ON 1.73 SQ M-ARVRAT: 118 ML/MIN/1.73M2 (ref 60–?)
GLOBULIN PLAS-MCNC: 3.4 G/DL (ref 2.8–4.4)
GLUCOSE BLD-MCNC: 96 MG/DL (ref 70–99)
HBA1C MFR BLD: 4.8 % (ref ?–5.7)
HCT VFR BLD AUTO: 44.2 %
HDLC SERPL-MCNC: 45 MG/DL (ref 40–59)
HGB BLD-MCNC: 14.8 G/DL
IMM GRANULOCYTES # BLD AUTO: 0.03 X10(3) UL (ref 0–1)
IMM GRANULOCYTES NFR BLD: 0.5 %
LDLC SERPL CALC-MCNC: 70 MG/DL (ref ?–100)
LYMPHOCYTES # BLD AUTO: 2.08 X10(3) UL (ref 1–4)
LYMPHOCYTES NFR BLD AUTO: 33.1 %
MCH RBC QN AUTO: 29.3 PG (ref 26–34)
MCHC RBC AUTO-ENTMCNC: 33.5 G/DL (ref 31–37)
MCV RBC AUTO: 87.5 FL
MONOCYTES # BLD AUTO: 0.33 X10(3) UL (ref 0.1–1)
MONOCYTES NFR BLD AUTO: 5.3 %
NEUTROPHILS # BLD AUTO: 3.61 X10 (3) UL (ref 1.5–7.7)
NEUTROPHILS # BLD AUTO: 3.61 X10(3) UL (ref 1.5–7.7)
NEUTROPHILS NFR BLD AUTO: 57.5 %
NONHDLC SERPL-MCNC: 85 MG/DL (ref ?–130)
OSMOLALITY SERPL CALC.SUM OF ELEC: 284 MOSM/KG (ref 275–295)
PLATELET # BLD AUTO: 310 10(3)UL (ref 150–450)
POTASSIUM SERPL-SCNC: 4.4 MMOL/L (ref 3.5–5.1)
PROT SERPL-MCNC: 7.4 G/DL (ref 6.4–8.2)
RBC # BLD AUTO: 5.05 X10(6)UL
SODIUM SERPL-SCNC: 137 MMOL/L (ref 136–145)
T4 FREE SERPL-MCNC: 0.8 NG/DL (ref 0.8–1.7)
TRIGL SERPL-MCNC: 76 MG/DL (ref 30–149)
TSI SER-ACNC: 0.91 MIU/ML (ref 0.36–3.74)
VLDLC SERPL CALC-MCNC: 12 MG/DL (ref 0–30)
WBC # BLD AUTO: 6.3 X10(3) UL (ref 4–11)

## 2023-02-14 PROCEDURE — 99213 OFFICE O/P EST LOW 20 MIN: CPT | Performed by: NURSE PRACTITIONER

## 2023-02-14 PROCEDURE — 83036 HEMOGLOBIN GLYCOSYLATED A1C: CPT | Performed by: NURSE PRACTITIONER

## 2023-02-14 PROCEDURE — 3008F BODY MASS INDEX DOCD: CPT | Performed by: NURSE PRACTITIONER

## 2023-02-14 PROCEDURE — 80061 LIPID PANEL: CPT | Performed by: NURSE PRACTITIONER

## 2023-02-14 PROCEDURE — 80050 GENERAL HEALTH PANEL: CPT | Performed by: NURSE PRACTITIONER

## 2023-02-14 PROCEDURE — 3078F DIAST BP <80 MM HG: CPT | Performed by: NURSE PRACTITIONER

## 2023-02-14 PROCEDURE — 3074F SYST BP LT 130 MM HG: CPT | Performed by: NURSE PRACTITIONER

## 2023-02-14 PROCEDURE — 84439 ASSAY OF FREE THYROXINE: CPT | Performed by: NURSE PRACTITIONER

## 2023-02-14 RX ORDER — AMOXICILLIN AND CLAVULANATE POTASSIUM 875; 125 MG/1; MG/1
1 TABLET, FILM COATED ORAL 2 TIMES DAILY
COMMUNITY
Start: 2023-02-09

## 2023-02-14 RX ORDER — TIRZEPATIDE 2.5 MG/.5ML
0.25 INJECTION, SOLUTION SUBCUTANEOUS WEEKLY
Qty: 1 ML | Refills: 0 | Status: SHIPPED | OUTPATIENT
Start: 2023-02-14 | End: 2023-02-14 | Stop reason: RX

## 2023-02-14 NOTE — TELEPHONE ENCOUNTER
Called phARMACY TO VERIFY WHO THE PHARMACY MANAGER IS AND IT IS [facility]    Requested PA electronically- filled out the questions and submitted the request

## 2023-02-16 RX ORDER — NALTREXONE HYDROCHLORIDE AND BUPROPION HYDROCHLORIDE 8; 90 MG/1; MG/1
TABLET, EXTENDED RELEASE ORAL
Qty: 70 TABLET | Refills: 0 | Status: SHIPPED | OUTPATIENT
Start: 2023-02-16 | End: 2023-02-16

## 2023-02-16 NOTE — TELEPHONE ENCOUNTER
Unfortunately unlikely other GLP-1 medications will be covered if [facility]KYLE NICOLE not covered. Phentermine not an option with her cardiac history. We could try an oral medication. It requires Prior Auth but worth trying.  If not, I would recommend scheduling with weight loss clinic for further recommendations

## 2023-02-20 ENCOUNTER — TELEPHONE (OUTPATIENT)
Dept: FAMILY MEDICINE CLINIC | Facility: CLINIC | Age: 38
End: 2023-02-20

## 2023-02-20 NOTE — TELEPHONE ENCOUNTER
Sent to pharmacy as: Celia Meyer 2.5 MG/0.5ML Subcutaneous Solution Pen-injector (Tirzepatide)    Reason for Discontinue: Availability      Called [phone] - advised advocate that Rx above discontinued - representative v/u  No further questions at this time

## 2023-02-20 NOTE — TELEPHONE ENCOUNTER
John Montenegro from [facility] called they wanted to know if we have received prior auth on mounjaro.       Can be reached at [phone]

## 2023-02-21 ENCOUNTER — APPOINTMENT (OUTPATIENT)
Dept: PHYSICAL THERAPY | Facility: HOSPITAL | Age: 38
End: 2023-02-21
Attending: NURSE PRACTITIONER
Payer: COMMERCIAL

## 2023-02-28 ENCOUNTER — APPOINTMENT (OUTPATIENT)
Dept: PHYSICAL THERAPY | Facility: HOSPITAL | Age: 38
End: 2023-02-28
Attending: NURSE PRACTITIONER
Payer: COMMERCIAL

## 2023-03-06 ENCOUNTER — TELEPHONE (OUTPATIENT)
Dept: FAMILY MEDICINE CLINIC | Facility: CLINIC | Age: 38
End: 2023-03-06

## 2023-03-06 RX ORDER — AZITHROMYCIN 250 MG/1
TABLET, FILM COATED ORAL
Qty: 6 TABLET | Refills: 0 | Status: SHIPPED | OUTPATIENT
Start: 2023-03-06 | End: 2023-03-11

## 2023-03-06 NOTE — TELEPHONE ENCOUNTER
PT CALLED TO ADV SHE HAS A RASH ALL OVER NECK AREA. PT ADV HAD A MD LIVE TELEPHONE VISIT ON Saturday FOR SINUS INFECTION. WAS PRESCRIBED AUGMENTIN. DOES NOT KNOW IF THIS IS A REACTION TO MEDICATION. LOOKING FOR RECOMMENDATIONS - DOESN'T KNOW IF SHE SHOULD CONTINUE OR BE SEEN.     PLEASE ADV    THANK YOU

## 2023-03-06 NOTE — TELEPHONE ENCOUNTER
Advised patient of WILFRIDO's note below. Patient verbalized understanding. No further questions at this time.

## 2023-03-06 NOTE — TELEPHONE ENCOUNTER
Pt reports rash started last night - over both sides of collar bone and back of neck    Pt describes rash as \"bad sunburn\" - dark red  Pt reports hot to touch, not itchy     Pt has not taken OTC meds at this time    Pt reports she has taken Augmentin in past - not first time taking, no issues before    Pt reports \"Everyone at home has some virus\" - but pt only with rash    No changes to rash from last night    Advised pt that Dr. Jeff Mueller not in office today, will have covering provider address - she v/u    Looking for recommendations  Please advise, thank you

## 2023-03-06 NOTE — TELEPHONE ENCOUNTER
Possible allergic reaction. Stop Augmentin and switch to Zpack.  F/u if sinus sx not improving or if she develops new sx  Topical or oral Benadryl

## 2023-03-07 ENCOUNTER — APPOINTMENT (OUTPATIENT)
Dept: PHYSICAL THERAPY | Facility: HOSPITAL | Age: 38
End: 2023-03-07
Attending: NURSE PRACTITIONER
Payer: COMMERCIAL

## 2023-03-14 ENCOUNTER — APPOINTMENT (OUTPATIENT)
Dept: PHYSICAL THERAPY | Facility: HOSPITAL | Age: 38
End: 2023-03-14
Attending: NURSE PRACTITIONER
Payer: COMMERCIAL

## 2023-04-17 ENCOUNTER — OFFICE VISIT (OUTPATIENT)
Dept: FAMILY MEDICINE CLINIC | Facility: CLINIC | Age: 38
End: 2023-04-17
Payer: COMMERCIAL

## 2023-04-17 VITALS
TEMPERATURE: 97 F | SYSTOLIC BLOOD PRESSURE: 110 MMHG | OXYGEN SATURATION: 97 % | HEART RATE: 79 BPM | BODY MASS INDEX: 28 KG/M2 | WEIGHT: 184 LBS | DIASTOLIC BLOOD PRESSURE: 70 MMHG

## 2023-04-17 DIAGNOSIS — R09.82 PND (POST-NASAL DRIP): ICD-10-CM

## 2023-04-17 DIAGNOSIS — R05.1 ACUTE COUGH: ICD-10-CM

## 2023-04-17 DIAGNOSIS — J01.01 ACUTE RECURRENT MAXILLARY SINUSITIS: Primary | ICD-10-CM

## 2023-04-17 PROCEDURE — 3074F SYST BP LT 130 MM HG: CPT | Performed by: NURSE PRACTITIONER

## 2023-04-17 PROCEDURE — 3078F DIAST BP <80 MM HG: CPT | Performed by: NURSE PRACTITIONER

## 2023-04-17 PROCEDURE — 99213 OFFICE O/P EST LOW 20 MIN: CPT | Performed by: NURSE PRACTITIONER

## 2023-04-17 RX ORDER — BENZONATATE 100 MG/1
100 CAPSULE ORAL 3 TIMES DAILY PRN
Qty: 30 CAPSULE | Refills: 0 | Status: SHIPPED | OUTPATIENT
Start: 2023-04-17

## 2023-04-17 RX ORDER — DOXYCYCLINE 100 MG/1
100 TABLET ORAL 2 TIMES DAILY
Qty: 20 TABLET | Refills: 0 | Status: SHIPPED | OUTPATIENT
Start: 2023-04-17 | End: 2023-04-27

## 2023-05-08 ENCOUNTER — OFFICE VISIT (OUTPATIENT)
Dept: FAMILY MEDICINE CLINIC | Facility: CLINIC | Age: 38
End: 2023-05-08
Payer: COMMERCIAL

## 2023-05-08 VITALS
DIASTOLIC BLOOD PRESSURE: 70 MMHG | WEIGHT: 189.5 LBS | TEMPERATURE: 98 F | BODY MASS INDEX: 29 KG/M2 | OXYGEN SATURATION: 99 % | SYSTOLIC BLOOD PRESSURE: 110 MMHG | HEART RATE: 77 BPM

## 2023-05-08 DIAGNOSIS — N73.0 ACUTE PELVIC INFLAMMATORY DISEASE (PID): ICD-10-CM

## 2023-05-08 DIAGNOSIS — R39.9 UTI SYMPTOMS: Primary | ICD-10-CM

## 2023-05-08 DIAGNOSIS — Z12.4 SCREENING FOR CERVICAL CANCER: ICD-10-CM

## 2023-05-08 DIAGNOSIS — N89.8 VAGINAL DISCHARGE: ICD-10-CM

## 2023-05-08 DIAGNOSIS — Z11.3 SCREEN FOR STD (SEXUALLY TRANSMITTED DISEASE): ICD-10-CM

## 2023-05-08 DIAGNOSIS — R10.2 PELVIC PAIN: ICD-10-CM

## 2023-05-08 LAB
APPEARANCE: CLEAR
BASOPHILS # BLD AUTO: 0.07 X10(3) UL (ref 0–0.2)
BASOPHILS NFR BLD AUTO: 1 %
BILIRUBIN: NEGATIVE
EOSINOPHIL # BLD AUTO: 0.26 X10(3) UL (ref 0–0.7)
EOSINOPHIL NFR BLD AUTO: 3.6 %
ERYTHROCYTE [DISTWIDTH] IN BLOOD BY AUTOMATED COUNT: 12.6 %
GLUCOSE (URINE DIPSTICK): NEGATIVE MG/DL
HCT VFR BLD AUTO: 40.7 %
HGB BLD-MCNC: 13.5 G/DL
IMM GRANULOCYTES # BLD AUTO: 0.01 X10(3) UL (ref 0–1)
IMM GRANULOCYTES NFR BLD: 0.1 %
KETONES (URINE DIPSTICK): NEGATIVE MG/DL
LEUKOCYTES: NEGATIVE
LYMPHOCYTES # BLD AUTO: 2.1 X10(3) UL (ref 1–4)
LYMPHOCYTES NFR BLD AUTO: 29.4 %
MCH RBC QN AUTO: 29.7 PG (ref 26–34)
MCHC RBC AUTO-ENTMCNC: 33.2 G/DL (ref 31–37)
MCV RBC AUTO: 89.5 FL
MONOCYTES # BLD AUTO: 0.39 X10(3) UL (ref 0.1–1)
MONOCYTES NFR BLD AUTO: 5.5 %
MULTISTIX EXPIRATION DATE: NORMAL DATE
MULTISTIX LOT#: NORMAL NUMERIC
NEUTROPHILS # BLD AUTO: 4.31 X10 (3) UL (ref 1.5–7.7)
NEUTROPHILS # BLD AUTO: 4.31 X10(3) UL (ref 1.5–7.7)
NEUTROPHILS NFR BLD AUTO: 60.4 %
NITRITE, URINE: NEGATIVE
OCCULT BLOOD: NEGATIVE
PH, URINE: 7 (ref 4.5–8)
PLATELET # BLD AUTO: 282 10(3)UL (ref 150–450)
PROTEIN (URINE DIPSTICK): NEGATIVE MG/DL
RBC # BLD AUTO: 4.55 X10(6)UL
SPECIFIC GRAVITY: 1.01 (ref 1–1.03)
URINE-COLOR: YELLOW
UROBILINOGEN,SEMI-QN: 0.2 MG/DL (ref 0–1.9)
WBC # BLD AUTO: 7.1 X10(3) UL (ref 4–11)

## 2023-05-08 PROCEDURE — 87480 CANDIDA DNA DIR PROBE: CPT | Performed by: NURSE PRACTITIONER

## 2023-05-08 PROCEDURE — 87510 GARDNER VAG DNA DIR PROBE: CPT | Performed by: NURSE PRACTITIONER

## 2023-05-08 PROCEDURE — 87491 CHLMYD TRACH DNA AMP PROBE: CPT | Performed by: NURSE PRACTITIONER

## 2023-05-08 PROCEDURE — 85025 COMPLETE CBC W/AUTO DIFF WBC: CPT | Performed by: NURSE PRACTITIONER

## 2023-05-08 PROCEDURE — 87086 URINE CULTURE/COLONY COUNT: CPT | Performed by: NURSE PRACTITIONER

## 2023-05-08 PROCEDURE — 87591 N.GONORRHOEAE DNA AMP PROB: CPT | Performed by: NURSE PRACTITIONER

## 2023-05-08 PROCEDURE — 87624 HPV HI-RISK TYP POOLED RSLT: CPT | Performed by: NURSE PRACTITIONER

## 2023-05-08 PROCEDURE — 87660 TRICHOMONAS VAGIN DIR PROBE: CPT | Performed by: NURSE PRACTITIONER

## 2023-05-08 RX ORDER — LEVOFLOXACIN 500 MG/1
500 TABLET, FILM COATED ORAL DAILY
Qty: 14 TABLET | Refills: 0 | Status: SHIPPED | OUTPATIENT
Start: 2023-05-08 | End: 2023-05-22

## 2023-05-08 RX ORDER — FLUOXETINE 10 MG/1
CAPSULE ORAL
COMMUNITY
Start: 2023-05-08

## 2023-05-08 RX ORDER — METRONIDAZOLE 500 MG/1
500 TABLET ORAL 2 TIMES DAILY
Qty: 28 TABLET | Refills: 0 | Status: SHIPPED | OUTPATIENT
Start: 2023-05-08 | End: 2023-05-22

## 2023-05-09 LAB
C TRACH DNA SPEC QL NAA+PROBE: NEGATIVE
HPV I/H RISK 1 DNA SPEC QL NAA+PROBE: NEGATIVE
N GONORRHOEA DNA SPEC QL NAA+PROBE: NEGATIVE

## 2023-05-10 ENCOUNTER — TELEPHONE (OUTPATIENT)
Dept: FAMILY MEDICINE CLINIC | Facility: CLINIC | Age: 38
End: 2023-05-10

## 2023-05-10 NOTE — TELEPHONE ENCOUNTER
I called patient to see how she is feeling. Patient is feeling much better. Not 100% better but nearly resolved.  Will call with any changes

## 2023-06-03 ENCOUNTER — HOSPITAL ENCOUNTER (OUTPATIENT)
Age: 38
Discharge: HOME | End: 2023-06-03
Payer: COMMERCIAL

## 2023-06-03 VITALS
DIASTOLIC BLOOD PRESSURE: 79 MMHG | OXYGEN SATURATION: 98 % | SYSTOLIC BLOOD PRESSURE: 117 MMHG | HEIGHT: 68 IN | RESPIRATION RATE: 18 BRPM | BODY MASS INDEX: 27.28 KG/M2 | TEMPERATURE: 97 F | HEART RATE: 87 BPM | WEIGHT: 180 LBS

## 2023-06-03 DIAGNOSIS — J06.9 VIRAL URI: Primary | ICD-10-CM

## 2023-06-03 LAB
S PYO AG THROAT QL: NEGATIVE
SARS-COV-2 RNA RESP QL NAA+PROBE: NOT DETECTED

## 2023-06-03 PROCEDURE — 99213 OFFICE O/P EST LOW 20 MIN: CPT | Performed by: NURSE PRACTITIONER

## 2023-06-03 PROCEDURE — U0002 COVID-19 LAB TEST NON-CDC: HCPCS | Performed by: NURSE PRACTITIONER

## 2023-06-03 PROCEDURE — 87880 STREP A ASSAY W/OPTIC: CPT | Performed by: NURSE PRACTITIONER

## 2023-06-03 NOTE — DISCHARGE INSTRUCTIONS
Rest and drink plenty of fluids. This will help to thin the mucous in the back of your throat. Take Tylenol and/or ibuprofen as needed for pain or fever. Use Zyrtec, Claritin, or Allegra to help with nasal drainage. You can also take Sudafed to help with sinus pressure or pain. Get the medication behind the pharmacy counter. Take Robitussin or Delsym as needed for cough. Follow up with your PCP in 5-7 days. Your symptoms should improve in the next 7-10 days; however, the cough can linger for much longer. Thank you for choosing [facility] [id number] for your care.

## 2023-06-28 ENCOUNTER — PATIENT MESSAGE (OUTPATIENT)
Dept: FAMILY MEDICINE CLINIC | Facility: CLINIC | Age: 38
End: 2023-06-28

## 2023-07-05 ENCOUNTER — OFFICE VISIT (OUTPATIENT)
Dept: FAMILY MEDICINE CLINIC | Facility: CLINIC | Age: 38
End: 2023-07-05
Payer: COMMERCIAL

## 2023-07-05 VITALS
WEIGHT: 186 LBS | SYSTOLIC BLOOD PRESSURE: 116 MMHG | OXYGEN SATURATION: 99 % | DIASTOLIC BLOOD PRESSURE: 80 MMHG | TEMPERATURE: 98 F | BODY MASS INDEX: 28 KG/M2 | HEART RATE: 58 BPM

## 2023-07-05 DIAGNOSIS — L65.9 FALLING HAIR: ICD-10-CM

## 2023-07-05 DIAGNOSIS — U09.9 POST COVID-19 CONDITION, UNSPECIFIED: ICD-10-CM

## 2023-07-05 DIAGNOSIS — R06.83 SNORING: ICD-10-CM

## 2023-07-05 DIAGNOSIS — E04.9 THYROID ENLARGEMENT: Primary | ICD-10-CM

## 2023-07-05 DIAGNOSIS — R68.82 DECREASED LIBIDO: ICD-10-CM

## 2023-07-05 LAB
CRP SERPL-MCNC: <0.29 MG/DL (ref ?–0.3)
ERYTHROCYTE [SEDIMENTATION RATE] IN BLOOD: 4 MM/HR
T3 SERPL-MCNC: 100 NG/DL (ref 60–181)
T4 FREE SERPL-MCNC: 0.8 NG/DL (ref 0.8–1.7)
THYROGLOB SERPL-MCNC: <15 U/ML (ref ?–60)
THYROPEROXIDASE AB SERPL-ACNC: 31 U/ML (ref ?–60)
TSI SER-ACNC: 0.76 MIU/ML (ref 0.36–3.74)

## 2023-07-05 PROCEDURE — 86140 C-REACTIVE PROTEIN: CPT | Performed by: FAMILY MEDICINE

## 2023-07-05 PROCEDURE — 86800 THYROGLOBULIN ANTIBODY: CPT | Performed by: FAMILY MEDICINE

## 2023-07-05 PROCEDURE — 86376 MICROSOMAL ANTIBODY EACH: CPT | Performed by: FAMILY MEDICINE

## 2023-07-05 PROCEDURE — 3074F SYST BP LT 130 MM HG: CPT | Performed by: FAMILY MEDICINE

## 2023-07-05 PROCEDURE — 84443 ASSAY THYROID STIM HORMONE: CPT | Performed by: FAMILY MEDICINE

## 2023-07-05 PROCEDURE — 84439 ASSAY OF FREE THYROXINE: CPT | Performed by: FAMILY MEDICINE

## 2023-07-05 PROCEDURE — 84480 ASSAY TRIIODOTHYRONINE (T3): CPT | Performed by: FAMILY MEDICINE

## 2023-07-05 PROCEDURE — 3079F DIAST BP 80-89 MM HG: CPT | Performed by: FAMILY MEDICINE

## 2023-07-05 PROCEDURE — 99214 OFFICE O/P EST MOD 30 MIN: CPT | Performed by: FAMILY MEDICINE

## 2023-07-05 PROCEDURE — 85652 RBC SED RATE AUTOMATED: CPT | Performed by: FAMILY MEDICINE

## 2023-07-06 ENCOUNTER — HOSPITAL ENCOUNTER (OUTPATIENT)
Dept: ULTRASOUND IMAGING | Age: 38
Discharge: HOME | End: 2023-07-06
Attending: FAMILY MEDICINE
Payer: COMMERCIAL

## 2023-07-06 DIAGNOSIS — R68.82 DECREASED LIBIDO: ICD-10-CM

## 2023-07-06 DIAGNOSIS — E04.9 THYROID ENLARGEMENT: ICD-10-CM

## 2023-07-06 PROCEDURE — 76536 US EXAM OF HEAD AND NECK: CPT | Performed by: FAMILY MEDICINE

## 2023-07-07 ENCOUNTER — TELEPHONE (OUTPATIENT)
Dept: FAMILY MEDICINE CLINIC | Facility: CLINIC | Age: 38
End: 2023-07-07

## 2023-07-07 DIAGNOSIS — E04.2 MULTIPLE THYROID NODULES: Primary | ICD-10-CM

## 2023-07-07 NOTE — TELEPHONE ENCOUNTER
Advised patient of Doctor's note below. Patient verbalized understanding. No further questions at this time.     Referral order placed    [facility] sent to pt  Notify me if not read by 07/14/23

## 2023-07-07 NOTE — TELEPHONE ENCOUNTER
----- Message from Tiffanie Lynch MD sent at 7/6/2023  6:45 PM CDT -----  Multiple thyroid nodules noted - nothing that immediately meets biopsy (FNA) criteria. However, I think it is a good idea to get an Endo consult to determine monitoring schedule / further evaluation if needed. Please place referral to see Dr Angella Grossman / associate. Thank you.

## 2023-07-10 ENCOUNTER — LAB ENCOUNTER (OUTPATIENT)
Dept: LAB | Age: 38
End: 2023-07-10
Attending: NURSE PRACTITIONER
Payer: COMMERCIAL

## 2023-07-10 ENCOUNTER — OFFICE VISIT (OUTPATIENT)
Dept: INTERNAL MEDICINE CLINIC | Facility: CLINIC | Age: 38
End: 2023-07-10
Payer: COMMERCIAL

## 2023-07-10 VITALS
SYSTOLIC BLOOD PRESSURE: 122 MMHG | RESPIRATION RATE: 16 BRPM | DIASTOLIC BLOOD PRESSURE: 78 MMHG | HEIGHT: 68 IN | OXYGEN SATURATION: 98 % | BODY MASS INDEX: 28 KG/M2 | HEART RATE: 88 BPM

## 2023-07-10 DIAGNOSIS — R06.83 SNORING: ICD-10-CM

## 2023-07-10 DIAGNOSIS — Z51.81 THERAPEUTIC DRUG MONITORING: ICD-10-CM

## 2023-07-10 DIAGNOSIS — F32.A ANXIETY AND DEPRESSION: ICD-10-CM

## 2023-07-10 DIAGNOSIS — F41.9 ANXIETY AND DEPRESSION: ICD-10-CM

## 2023-07-10 DIAGNOSIS — E66.3 OVERWEIGHT (BMI 25.0-29.9): ICD-10-CM

## 2023-07-10 DIAGNOSIS — Z86.79 HISTORY OF SUPRAVENTRICULAR TACHYCARDIA: ICD-10-CM

## 2023-07-10 DIAGNOSIS — Z51.81 THERAPEUTIC DRUG MONITORING: Primary | ICD-10-CM

## 2023-07-10 LAB
VIT B12 SERPL-MCNC: 692 PG/ML (ref 193–986)
VIT D+METAB SERPL-MCNC: 43.3 NG/ML (ref 30–100)

## 2023-07-10 PROCEDURE — 82306 VITAMIN D 25 HYDROXY: CPT | Performed by: NURSE PRACTITIONER

## 2023-07-10 PROCEDURE — 82607 VITAMIN B-12: CPT | Performed by: NURSE PRACTITIONER

## 2023-07-10 NOTE — PATIENT INSTRUCTIONS
Welcome to the [facility]!!  Thank you for placing your trust in our health care team, I look forward to working with you along this journey to better health! Next steps:     1.  Schedule a personal nutrition consultation with one of our registered dieticians. 2.  Get blood work done (vitamin d and b12)   3. Can try aqua therapy for joint pain      1. Drink water with meals and throughout the day, cut down on soda and/or juice if consumed. Consider flavored water options like Bubbly, Spindrift, Hint and Anna. 2.  Eat breakfast daily and focus on having protein with each meal, examples include: greek yogurt, cottage cheese, hard boiled egg, whole grain toast with peanut butter. 3.  Reduce refined carbohydrates and sugars which includes items such as sweets, as well as rice, pasta, and bread and make sure to choose whole grain options when having them with just 1 serving per meal about the size of your inner palm. 4.  Consume non starchy veggies daily working towards making them a good 50% of your daily food intake. Add them to lunch and dinner consistently. 5.  Optional can start a daily probiotic: VSL#3, (order on line at www.vsl3. com). Take 1 capsule daily with water for 30 days, then reduce to 1 every other day (this will reduce the cost). Capsules can be left out for 2 weeks, but then must be refrigerated. Please download raheel My Fitness Lisette Riki! Or Net Diary to monitor daily dietary intake and you will be able to see if you are eating the right amount of calories or too much or too little which would hinder weight loss. Additionally this will help to see your daily carbohydrate and protein intake. When you set the raheel up choose 1-2 lbs/week as a goal.  Keeping a paper food journal is an option as well to remain accountable for your choices- this is the start to mindful eating! A low calorie diet has been consistently shown to support weight loss.      Continue or start exercising to help establish a routine. If not already exercising begin with 1 day and progress as able with long-term goal of 30 minutes 5 days a week at a minimum. Meditation daily can help manage and control stress. Chronic stress can make weight loss difficult. Exercising is one way to help with stress, but meditation using the CALM Matthieu or another comparable alternative can be done in your home or place of work with little time commitment. This Matthieu can also help work on behavior change and improve sleep. Check out the segment under Calm Masterclass and listen to The 4 Pillars of Health. A great way to begin learning about the foundation of lifestyle with practical tips to use in your every day. Check out www.yourweightmatters. org blog for continued daily support and education along this weight loss journey! Patient Resources:     Personal Training/Fitness Classes/Health Coaching     Edil Riggins and Richard Areli @ [email or web address]/ Full fitness center with group fitness and personal training. Discount available as client of [facility]. Health Coaching and Personal Training with Killian King at our [facility]- individual weekly coaching with option to add personal training and small group fitness classes targeted at weight loss- [phone] and/or email @ Maryfrances [email or web address]. [email or web address]  360FIT [place] [email or web address] Group Fitness [phone] and/or email Jesus Aranda at [email or web address]  [address] [facility] with multiple locations: Aetna (www.Grupanya), Eat The Dimdim Fitness (www.[place]. [employer]), PinnacleCare (www.[facility] [employer]. [employer]), The Exercise  (www.exercisecoach.[employer])     Online Fitness  Fitness  on [employer] in 10 DVD series- www. vxthn48QGY. [employer]  Sit and Be Fit - Chair exercise series Www.sitandbefit. org  Hip Hop Fit with Jimmy Sanchez at www.hiphopfit. net     Apps for on the LiveRail 7 Minute Workout (orange box with white 7) - free on the go HIIT training matthieu  Peloton Matthieu @ wwwTapatalk     Nutrition Trackers and Tools  LoseIT! And My Fitness Pal apps and on line for tracking nutrition  NOOM - virtual health coaching  FitFoundation (healthy meals on the go) in [facility] @ www. nbdpbsxvxmpni0r. Mike Jones MD @ www.Nutrinod.com and Jovanny Chen (keto and low carb plans recommended) @ www. EQIOZB07.XHS, Metabolic Meals @ www. MyMetabolicMeals. com - individual prepared meals to go  Founder International Software, Biolog[employer]Inc, [employer], Every Plate, In The Chat Communications- on line meal delivery programs for preparation at home  [place] Loladex in [place] for homemade meals to go @ wwwGracenote  Diet Doctor @ www. dietdoctor. [employer] Software - low carb swaps  YuGust - meal prep and planning matthieu (www.yummly. com)     Stress Management/Behavior/Mindful Eating  CALM meditation matthieu (www.Idle Free Systems)  Headspace  Am I Hungry? Mindful eating virtual  matthieu  Www.yourweightmatters. org - Obesity Action Coalition sponsored Blog posts daily  Motivation matthieu (black box with white \")- daily supportive messages sent to your phone     Books/Video Education/Podcasts  Mindless Eating by Shannan Hanks  Why We Get Sick by Dorina Vincent (a book about insulin resistance)  Atomic Habits by Cherise Morton (a book about taking small steps to promote greater behavior change)   Can't Hurt Me by Tan Austin (a book exploring the power of discipline in achieving your goals)  The End of Dieting: How to Live for Life by Dr. Mikey Zamarripa M.D. or listen to The [address] Episode 61: Understanding \"Nutritarian\" Eating w/Dr. Mikey Zamarripa  Your Body in Balance: The World [employer] of Food, Hormones, and Health by Dr. Jinny Emerson  The Menopause Diet Plan by Scott Sommer and [facility] AT [facility]  The Complete Guide to fasting by Dr. Nikia Santoyo, [address] [facility] by Lonnie Byrne, Ph.D, R.D.   Weight Loss Surgery Will Not Treat Food Addiction by Daralene Cushing Ph.D  The Game Changers- Netflix Documentary on plant based nutrition  Fed Up - documentary about obesity (Free on [place])  The Truth About Sugar - documentary on sugar (Free on Utube, https://youMobile Learning Networksu. be/0T8xuuvAC2i)  The Dr. Laly Medina by Dr. Maximino Cueto MD  Fitlosophy Fitspiration - journal to better health (found at [employer] in fitness aisle)  What Happened to You?- a look at the impact trauma has on behavior written by Trudy Bourgeois and Dr. John Higgins Again by Neris Dixon - discovering your true self after trauma  Pama Elmo talk on Netflix, The Call to Courage  Podcasts: The Exam Room by the Physician's Committee, Nutrition Facts by Dr. Bradford Maki    We are here to support you with weight loss, but please remember that you still need your primary care provider for your routine health maintenance.

## 2023-07-27 ENCOUNTER — OFFICE VISIT (OUTPATIENT)
Dept: SLEEP CENTER | Age: 38
End: 2023-07-27
Attending: FAMILY MEDICINE
Payer: COMMERCIAL

## 2023-07-27 PROCEDURE — 95810 POLYSOM 6/> YRS 4/> PARAM: CPT

## 2023-08-01 ENCOUNTER — TELEPHONE (OUTPATIENT)
Dept: FAMILY MEDICINE CLINIC | Facility: CLINIC | Age: 38
End: 2023-08-01

## 2023-08-01 ENCOUNTER — HOSPITAL ENCOUNTER (OUTPATIENT)
Age: 38
Discharge: HOME | End: 2023-08-01
Payer: COMMERCIAL

## 2023-08-01 VITALS
OXYGEN SATURATION: 98 % | WEIGHT: 180 LBS | SYSTOLIC BLOOD PRESSURE: 105 MMHG | BODY MASS INDEX: 27.28 KG/M2 | DIASTOLIC BLOOD PRESSURE: 62 MMHG | HEIGHT: 68 IN | TEMPERATURE: 98 F | RESPIRATION RATE: 16 BRPM | HEART RATE: 88 BPM

## 2023-08-01 DIAGNOSIS — R10.2 VAGINAL PAIN: Primary | ICD-10-CM

## 2023-08-01 LAB
B-HCG UR QL: NEGATIVE
POCT BILIRUBIN URINE: NEGATIVE
POCT BLOOD URINE: NEGATIVE
POCT GLUCOSE URINE: NEGATIVE MG/DL
POCT KETONE URINE: NEGATIVE MG/DL
POCT LEUKOCYTE ESTERASE URINE: NEGATIVE
POCT NITRITE URINE: NEGATIVE
POCT PH URINE: 6 (ref 5–8)
POCT PROTEIN URINE: NEGATIVE MG/DL
POCT SPECIFIC GRAVITY URINE: 1.01
POCT URINE CLARITY: CLEAR
POCT URINE COLOR: YELLOW
POCT UROBILINOGEN URINE: 0.2 MG/DL

## 2023-08-01 PROCEDURE — 99214 OFFICE O/P EST MOD 30 MIN: CPT | Performed by: NURSE PRACTITIONER

## 2023-08-01 PROCEDURE — 87591 N.GONORRHOEAE DNA AMP PROB: CPT | Performed by: NURSE PRACTITIONER

## 2023-08-01 PROCEDURE — 81002 URINALYSIS NONAUTO W/O SCOPE: CPT | Performed by: NURSE PRACTITIONER

## 2023-08-01 PROCEDURE — 87491 CHLMYD TRACH DNA AMP PROBE: CPT | Performed by: NURSE PRACTITIONER

## 2023-08-01 PROCEDURE — 87510 GARDNER VAG DNA DIR PROBE: CPT | Performed by: NURSE PRACTITIONER

## 2023-08-01 PROCEDURE — 81025 URINE PREGNANCY TEST: CPT | Performed by: NURSE PRACTITIONER

## 2023-08-01 PROCEDURE — 87660 TRICHOMONAS VAGIN DIR PROBE: CPT | Performed by: NURSE PRACTITIONER

## 2023-08-01 PROCEDURE — 87480 CANDIDA DNA DIR PROBE: CPT | Performed by: NURSE PRACTITIONER

## 2023-08-01 RX ORDER — METRONIDAZOLE 500 MG/1
500 TABLET ORAL 2 TIMES DAILY
Qty: 14 TABLET | Refills: 0 | Status: SHIPPED | OUTPATIENT
Start: 2023-08-01 | End: 2023-08-02

## 2023-08-01 NOTE — ED INITIAL ASSESSMENT (HPI)
Pt c/o vaginal pain. Denies discharge. History of bv.   Pt states just started using menstrual discs

## 2023-08-01 NOTE — DISCHARGE INSTRUCTIONS
Take metronidazole as directed. Do not drink alcohol while taking this medication for 24 hours following the last dose. Recommend close follow-up with your gynecologist    Take 200mg of Ibuprofen with 500mg of Tylenol (Acetaminophen) every 4-6 hours as needed for pain  You may take up to a maximum of 600mg or Ibuprofen along with 2 extra-strength Tylenol (Acetaminophen) every 6-8 hours as needed for pain. Seek immediate medical attention if you are running fevers or having worsening symptoms. We will contact you with the results of vaginal cultures within a few days.

## 2023-08-01 NOTE — TELEPHONE ENCOUNTER
PATIENT SAYS SHE WAS AT [address] SHE WAS PRESCRIBED AN ANTIBIOTIC. PATIENT DOES NOT FEEL COMFORTABLE TAKING THIS ANTIBIOTIC BECAUSE SHE HAS NEVER TAKEN IT BEFORE. SHE IS ASKING IF PJ CAN PRESCRIBE HER THE SAME THING SHE PRESCRIBED LAST TIME FOR BV.

## 2023-08-01 NOTE — TELEPHONE ENCOUNTER
Spoke with patient who states she is concerned about taking metronidazole as she does not recall taking it previously. Discussed with patient AVERY prescribed metronidazole and levaquin 5/8/23 to treat PID  Patient reports no issues with medications. Asking if there is any alternative where she can drink alcohol. Advised the vaginal suppository may be an option. Patient states she will stick with the pill and not drink any alcohol.      Erica VARELA

## 2023-08-02 LAB
C TRACH DNA SPEC QL NAA+PROBE: NEGATIVE
N GONORRHOEA DNA SPEC QL NAA+PROBE: NEGATIVE

## 2023-08-02 RX ORDER — METRONIDAZOLE 7.5 MG/G
1 GEL VAGINAL NIGHTLY
Qty: 70 G | Refills: 0 | Status: SHIPPED | OUTPATIENT
Start: 2023-08-02 | End: 2023-08-07

## 2023-08-04 ENCOUNTER — HOSPITAL ENCOUNTER (EMERGENCY)
Age: 38
Discharge: HOME | End: 2023-08-04
Attending: EMERGENCY MEDICINE
Payer: COMMERCIAL

## 2023-08-04 ENCOUNTER — APPOINTMENT (OUTPATIENT)
Dept: ULTRASOUND IMAGING | Age: 38
End: 2023-08-04
Attending: EMERGENCY MEDICINE
Payer: COMMERCIAL

## 2023-08-04 ENCOUNTER — HOSPITAL ENCOUNTER (OUTPATIENT)
Age: 38
Discharge: ANOTHER HOSPITAL | End: 2023-08-04
Payer: COMMERCIAL

## 2023-08-04 VITALS
DIASTOLIC BLOOD PRESSURE: 85 MMHG | SYSTOLIC BLOOD PRESSURE: 108 MMHG | TEMPERATURE: 99 F | HEIGHT: 68 IN | WEIGHT: 180 LBS | OXYGEN SATURATION: 100 % | RESPIRATION RATE: 18 BRPM | BODY MASS INDEX: 27.28 KG/M2 | HEART RATE: 71 BPM

## 2023-08-04 VITALS
HEART RATE: 77 BPM | TEMPERATURE: 98 F | OXYGEN SATURATION: 98 % | DIASTOLIC BLOOD PRESSURE: 81 MMHG | RESPIRATION RATE: 18 BRPM | SYSTOLIC BLOOD PRESSURE: 110 MMHG

## 2023-08-04 DIAGNOSIS — R10.2 PELVIC PAIN: Primary | ICD-10-CM

## 2023-08-04 LAB
ALBUMIN SERPL-MCNC: 4.1 G/DL (ref 3.4–5)
ALBUMIN/GLOB SERPL: 1.1 {RATIO} (ref 1–2)
ALP LIVER SERPL-CCNC: 69 U/L
ALT SERPL-CCNC: 22 U/L
ANION GAP SERPL CALC-SCNC: 2 MMOL/L (ref 0–18)
AST SERPL-CCNC: 12 U/L (ref 15–37)
B-HCG UR QL: NEGATIVE
BASOPHILS # BLD AUTO: 0.06 X10(3) UL (ref 0–0.2)
BASOPHILS NFR BLD AUTO: 0.8 %
BILIRUB SERPL-MCNC: 0.5 MG/DL (ref 0.1–2)
BILIRUB UR QL STRIP.AUTO: NEGATIVE
BUN BLD-MCNC: 11 MG/DL (ref 7–18)
CALCIUM BLD-MCNC: 8.6 MG/DL (ref 8.5–10.1)
CHLORIDE SERPL-SCNC: 107 MMOL/L (ref 98–112)
CLARITY UR REFRACT.AUTO: CLEAR
CO2 SERPL-SCNC: 27 MMOL/L (ref 21–32)
COLOR UR AUTO: YELLOW
CREAT BLD-MCNC: 0.76 MG/DL
EGFRCR SERPLBLD CKD-EPI 2021: 104 ML/MIN/1.73M2 (ref 60–?)
EOSINOPHIL # BLD AUTO: 0.2 X10(3) UL (ref 0–0.7)
EOSINOPHIL NFR BLD AUTO: 2.6 %
ERYTHROCYTE [DISTWIDTH] IN BLOOD BY AUTOMATED COUNT: 13 %
GLOBULIN PLAS-MCNC: 3.7 G/DL (ref 2.8–4.4)
GLUCOSE BLD-MCNC: 89 MG/DL (ref 70–99)
GLUCOSE UR STRIP.AUTO-MCNC: NEGATIVE MG/DL
HCT VFR BLD AUTO: 45.3 %
HGB BLD-MCNC: 15.2 G/DL
IMM GRANULOCYTES # BLD AUTO: 0.01 X10(3) UL (ref 0–1)
IMM GRANULOCYTES NFR BLD: 0.1 %
KETONES UR STRIP.AUTO-MCNC: NEGATIVE MG/DL
LEUKOCYTE ESTERASE UR QL STRIP.AUTO: NEGATIVE
LYMPHOCYTES # BLD AUTO: 2.4 X10(3) UL (ref 1–4)
LYMPHOCYTES NFR BLD AUTO: 30.8 %
MCH RBC QN AUTO: 29.1 PG (ref 26–34)
MCHC RBC AUTO-ENTMCNC: 33.6 G/DL (ref 31–37)
MCV RBC AUTO: 86.6 FL
MONOCYTES # BLD AUTO: 0.45 X10(3) UL (ref 0.1–1)
MONOCYTES NFR BLD AUTO: 5.8 %
NEUTROPHILS # BLD AUTO: 4.68 X10 (3) UL (ref 1.5–7.7)
NEUTROPHILS # BLD AUTO: 4.68 X10(3) UL (ref 1.5–7.7)
NEUTROPHILS NFR BLD AUTO: 59.9 %
NITRITE UR QL STRIP.AUTO: NEGATIVE
OSMOLALITY SERPL CALC.SUM OF ELEC: 281 MOSM/KG (ref 275–295)
PH UR STRIP.AUTO: 7 [PH] (ref 5–8)
PLATELET # BLD AUTO: 287 10(3)UL (ref 150–450)
POTASSIUM SERPL-SCNC: 4.2 MMOL/L (ref 3.5–5.1)
PROT SERPL-MCNC: 7.8 G/DL (ref 6.4–8.2)
PROT UR STRIP.AUTO-MCNC: NEGATIVE MG/DL
RBC # BLD AUTO: 5.23 X10(6)UL
RBC UR QL AUTO: NEGATIVE
SODIUM SERPL-SCNC: 136 MMOL/L (ref 136–145)
SP GR UR STRIP.AUTO: 1.01 (ref 1–1.03)
UROBILINOGEN UR STRIP.AUTO-MCNC: 0.2 MG/DL
WBC # BLD AUTO: 7.8 X10(3) UL (ref 4–11)

## 2023-08-04 PROCEDURE — 93975 VASCULAR STUDY: CPT | Performed by: EMERGENCY MEDICINE

## 2023-08-04 PROCEDURE — 80053 COMPREHEN METABOLIC PANEL: CPT | Performed by: EMERGENCY MEDICINE

## 2023-08-04 PROCEDURE — 81025 URINE PREGNANCY TEST: CPT

## 2023-08-04 PROCEDURE — 81003 URINALYSIS AUTO W/O SCOPE: CPT | Performed by: EMERGENCY MEDICINE

## 2023-08-04 PROCEDURE — 85025 COMPLETE CBC W/AUTO DIFF WBC: CPT | Performed by: EMERGENCY MEDICINE

## 2023-08-04 PROCEDURE — 76856 US EXAM PELVIC COMPLETE: CPT | Performed by: EMERGENCY MEDICINE

## 2023-08-04 PROCEDURE — 99214 OFFICE O/P EST MOD 30 MIN: CPT | Performed by: NURSE PRACTITIONER

## 2023-08-04 PROCEDURE — 99284 EMERGENCY DEPT VISIT MOD MDM: CPT

## 2023-08-04 PROCEDURE — 76830 TRANSVAGINAL US NON-OB: CPT | Performed by: EMERGENCY MEDICINE

## 2023-08-04 PROCEDURE — 99285 EMERGENCY DEPT VISIT HI MDM: CPT

## 2023-08-04 PROCEDURE — 96361 HYDRATE IV INFUSION ADD-ON: CPT

## 2023-08-04 PROCEDURE — 96374 THER/PROPH/DIAG INJ IV PUSH: CPT

## 2023-08-04 RX ORDER — KETOROLAC TROMETHAMINE 30 MG/ML
30 INJECTION, SOLUTION INTRAMUSCULAR; INTRAVENOUS ONCE
Status: COMPLETED | OUTPATIENT
Start: 2023-08-04 | End: 2023-08-04

## 2023-08-04 RX ORDER — KETOROLAC TROMETHAMINE 10 MG/1
10 TABLET, FILM COATED ORAL EVERY 6 HOURS PRN
Qty: 30 TABLET | Refills: 0 | Status: SHIPPED | OUTPATIENT
Start: 2023-08-04 | End: 2023-08-11

## 2023-08-04 RX ORDER — MORPHINE SULFATE 4 MG/ML
4 INJECTION, SOLUTION INTRAMUSCULAR; INTRAVENOUS ONCE
Status: DISCONTINUED | OUTPATIENT
Start: 2023-08-04 | End: 2023-08-04

## 2023-08-04 NOTE — ED INITIAL ASSESSMENT (HPI)
Patient states she was treated in [facility] on 8/1/23. + for BV. Treated with oral Flagyl. PCP changed in to vaginal sup per patient's request on 8/2/23. C/O pelvic pain worsening.

## 2023-08-04 NOTE — DISCHARGE INSTRUCTIONS
Please go directly to [facility] for further evaluation and possible advanced imaging that was not available here in the immediate care

## 2023-08-04 NOTE — ED INITIAL ASSESSMENT (HPI)
Went to Urgent Care on 8/1 for pelvic pain. +BV, No vag discharge. Started on flagyl. Now pelvic pain is worse. Sent from [facility] for pelvic ultrasound.

## 2023-08-08 ENCOUNTER — TELEPHONE (OUTPATIENT)
Dept: FAMILY MEDICINE CLINIC | Facility: CLINIC | Age: 38
End: 2023-08-08

## 2023-08-08 NOTE — TELEPHONE ENCOUNTER
----- Message from Grace Tracy MD sent at 8/3/2023  1:30 PM CDT -----  Recommend sleep diaries be sent to a patient and schedule consult to determine if there are other factors to be discussed. May benefit from sleep  hygiene and possibly sleep labs.

## 2023-08-09 ENCOUNTER — OFFICE VISIT (OUTPATIENT)
Dept: INTERNAL MEDICINE CLINIC | Facility: CLINIC | Age: 38
End: 2023-08-09
Payer: COMMERCIAL

## 2023-08-09 DIAGNOSIS — K21.9 GASTROESOPHAGEAL REFLUX DISEASE, UNSPECIFIED WHETHER ESOPHAGITIS PRESENT: ICD-10-CM

## 2023-08-09 DIAGNOSIS — Z51.81 THERAPEUTIC DRUG MONITORING: ICD-10-CM

## 2023-08-09 DIAGNOSIS — E66.3 OVERWEIGHT (BMI 25.0-29.9): Primary | ICD-10-CM

## 2023-08-09 PROCEDURE — 97802 MEDICAL NUTRITION INDIV IN: CPT | Performed by: DIETITIAN, REGISTERED

## 2023-08-15 ENCOUNTER — SLEEP STUDY (OUTPATIENT)
Dept: FAMILY MEDICINE CLINIC | Facility: CLINIC | Age: 38
End: 2023-08-15
Payer: COMMERCIAL

## 2023-08-15 DIAGNOSIS — G47.9 SLEEP DISORDER: Primary | ICD-10-CM

## 2023-08-15 PROCEDURE — 95810 POLYSOM 6/> YRS 4/> PARAM: CPT | Performed by: FAMILY MEDICINE

## 2023-08-16 ENCOUNTER — TELEPHONE (OUTPATIENT)
Dept: FAMILY MEDICINE CLINIC | Facility: CLINIC | Age: 38
End: 2023-08-16

## 2023-08-16 RX ORDER — METRONIDAZOLE 7.5 MG/G
1 GEL VAGINAL NIGHTLY
Qty: 70 G | Refills: 0 | Status: SHIPPED | OUTPATIENT
Start: 2023-08-16 | End: 2023-08-21

## 2023-08-16 NOTE — TELEPHONE ENCOUNTER
Pt calling - reports still having BV symptoms    Has gone to UC x2 and ER once    Ultrasound did not show anything  Was advised to Follow-up with OBGYN  Has appt 08/28/23    Looking for other recommendations at this time    Pt reports has been taking the \"strong\" motrin - takes edge off, does not want to take regularly  Makes pain tolerable  Not getting worse, but not going away    Please advise, thank you

## 2023-08-16 NOTE — TELEPHONE ENCOUNTER
I would actually wait on taking medications then.  Sometimes the period will naturally reset the pH  If symptoms persisting after period finishes, then start Metrogel

## 2023-08-16 NOTE — TELEPHONE ENCOUNTER
Advised patient of WILFRIDO's note below. Patient verbalized understanding. Pt reports she started her period  Should she take oral med instead?     Please advise, thank you

## 2023-08-24 ENCOUNTER — MED REC SCAN ONLY (OUTPATIENT)
Dept: FAMILY MEDICINE CLINIC | Facility: CLINIC | Age: 38
End: 2023-08-24

## 2023-08-29 ENCOUNTER — OFFICE VISIT (OUTPATIENT)
Dept: OBGYN CLINIC | Facility: CLINIC | Age: 38
End: 2023-08-29
Payer: COMMERCIAL

## 2023-08-29 VITALS
DIASTOLIC BLOOD PRESSURE: 66 MMHG | BODY MASS INDEX: 28 KG/M2 | HEART RATE: 85 BPM | SYSTOLIC BLOOD PRESSURE: 112 MMHG | WEIGHT: 186.81 LBS

## 2023-08-29 DIAGNOSIS — N76.1 CHRONIC VAGINITIS: ICD-10-CM

## 2023-08-29 DIAGNOSIS — R10.2 PELVIC PAIN: Primary | ICD-10-CM

## 2023-08-29 PROCEDURE — 3078F DIAST BP <80 MM HG: CPT | Performed by: NURSE PRACTITIONER

## 2023-08-29 PROCEDURE — 3074F SYST BP LT 130 MM HG: CPT | Performed by: NURSE PRACTITIONER

## 2023-08-29 PROCEDURE — 99213 OFFICE O/P EST LOW 20 MIN: CPT | Performed by: NURSE PRACTITIONER

## 2023-08-31 ENCOUNTER — TELEPHONE (OUTPATIENT)
Dept: OBGYN CLINIC | Facility: CLINIC | Age: 38
End: 2023-08-31

## 2023-08-31 RX ORDER — METRONIDAZOLE 500 MG/1
500 TABLET ORAL 2 TIMES DAILY
Qty: 14 TABLET | Refills: 0 | Status: SHIPPED | OUTPATIENT
Start: 2023-08-31 | End: 2023-09-07

## 2023-09-20 ENCOUNTER — MED REC SCAN ONLY (OUTPATIENT)
Dept: OBGYN CLINIC | Facility: CLINIC | Age: 38
End: 2023-09-20

## 2023-09-26 ENCOUNTER — OFFICE VISIT (OUTPATIENT)
Dept: OBGYN CLINIC | Facility: CLINIC | Age: 38
End: 2023-09-26
Payer: COMMERCIAL

## 2023-09-26 VITALS
HEART RATE: 74 BPM | DIASTOLIC BLOOD PRESSURE: 74 MMHG | BODY MASS INDEX: 28 KG/M2 | SYSTOLIC BLOOD PRESSURE: 112 MMHG | WEIGHT: 184.19 LBS

## 2023-09-26 DIAGNOSIS — N76.0 VAGINITIS AND VULVOVAGINITIS: ICD-10-CM

## 2023-09-26 DIAGNOSIS — Z23 NEED FOR VACCINATION: Primary | ICD-10-CM

## 2023-09-26 PROCEDURE — 3078F DIAST BP <80 MM HG: CPT | Performed by: NURSE PRACTITIONER

## 2023-09-26 PROCEDURE — 87480 CANDIDA DNA DIR PROBE: CPT | Performed by: NURSE PRACTITIONER

## 2023-09-26 PROCEDURE — 87510 GARDNER VAG DNA DIR PROBE: CPT | Performed by: NURSE PRACTITIONER

## 2023-09-26 PROCEDURE — 87660 TRICHOMONAS VAGIN DIR PROBE: CPT | Performed by: NURSE PRACTITIONER

## 2023-09-26 PROCEDURE — 3074F SYST BP LT 130 MM HG: CPT | Performed by: NURSE PRACTITIONER

## 2023-09-26 PROCEDURE — 99213 OFFICE O/P EST LOW 20 MIN: CPT | Performed by: NURSE PRACTITIONER

## 2023-09-27 ENCOUNTER — PATIENT MESSAGE (OUTPATIENT)
Dept: OBGYN CLINIC | Facility: CLINIC | Age: 38
End: 2023-09-27

## 2023-11-07 ENCOUNTER — TELEPHONE (OUTPATIENT)
Dept: FAMILY MEDICINE CLINIC | Facility: CLINIC | Age: 38
End: 2023-11-07

## 2023-11-07 NOTE — TELEPHONE ENCOUNTER
Patient has been taking Prozac 10mg    She feels this dosage is not helping much/she is not feeling any effects    She would like to see about increasing     Patient states this increase (increase to 20mg) was discussed in the past with Wan Lopez NP    Please adv  Thank you

## 2023-11-07 NOTE — TELEPHONE ENCOUNTER
Denise Cornelius MD  You27 minutes ago (11:46 AM)     I am ok with any type of visit and you can put her in tm or Wednesday between currently schedule pts. Thank you     Advised patient of Dr. Jonh Alva note above. Patient verbalized understanding. Pt reports would like to stay with Dr. Sierra Villa as PCP. No further questions at this time.     Future Appointments   Date Time Provider Dieter Holland   11/8/2023  9:00 AM Denise Cornelius MD [facility]US[facility]

## 2023-11-07 NOTE — TELEPHONE ENCOUNTER
Please see note below  Pt would like to discuss dose adjustment for prozac    (LOV 07/05/23 w/ Dr. Gross [place] 05/08/253 w/ Bear ANNA)    Schedule in office appt? Video visit? When to schedule?     Please advise, thank you

## 2023-11-08 ENCOUNTER — HOSPITAL ENCOUNTER (OUTPATIENT)
Age: 38
Discharge: HOME | End: 2023-11-08
Payer: COMMERCIAL

## 2023-11-08 VITALS
TEMPERATURE: 97 F | RESPIRATION RATE: 16 BRPM | BODY MASS INDEX: 27.28 KG/M2 | HEIGHT: 68 IN | HEART RATE: 74 BPM | DIASTOLIC BLOOD PRESSURE: 79 MMHG | SYSTOLIC BLOOD PRESSURE: 112 MMHG | OXYGEN SATURATION: 97 % | WEIGHT: 180 LBS

## 2023-11-08 DIAGNOSIS — J06.9 VIRAL UPPER RESPIRATORY ILLNESS: Primary | ICD-10-CM

## 2023-11-08 DIAGNOSIS — J02.9 VIRAL PHARYNGITIS: ICD-10-CM

## 2023-11-08 LAB — S PYO AG THROAT QL: NEGATIVE

## 2023-11-08 PROCEDURE — 87880 STREP A ASSAY W/OPTIC: CPT | Performed by: PHYSICIAN ASSISTANT

## 2023-11-08 PROCEDURE — 99203 OFFICE O/P NEW LOW 30 MIN: CPT | Performed by: PHYSICIAN ASSISTANT

## 2023-11-08 RX ORDER — METHYLPREDNISOLONE 4 MG/1
TABLET ORAL
Qty: 1 EACH | Refills: 0 | Status: SHIPPED | OUTPATIENT
Start: 2023-11-08

## 2023-11-08 RX ORDER — BENZONATATE 100 MG/1
100 CAPSULE ORAL 3 TIMES DAILY PRN
Qty: 30 CAPSULE | Refills: 0 | Status: SHIPPED | OUTPATIENT
Start: 2023-11-08 | End: 2023-12-08

## 2023-11-08 RX ORDER — ALBUTEROL SULFATE 90 UG/1
2 AEROSOL, METERED RESPIRATORY (INHALATION) EVERY 4 HOURS PRN
Qty: 1 EACH | Refills: 0 | Status: SHIPPED | OUTPATIENT
Start: 2023-11-08 | End: 2023-12-08

## 2023-11-08 NOTE — DISCHARGE INSTRUCTIONS
Strep negative  Monitor your throat and mouth closely. The lesion could be an isolated canker sore but there is a possibility for early hand-foot-and-mouth. Monitor for fever. Take steroid course as written.

## 2023-11-26 ENCOUNTER — HOSPITAL ENCOUNTER (OUTPATIENT)
Age: 38
Discharge: HOME | End: 2023-11-26
Payer: COMMERCIAL

## 2023-11-26 VITALS
HEART RATE: 84 BPM | RESPIRATION RATE: 18 BRPM | WEIGHT: 185 LBS | SYSTOLIC BLOOD PRESSURE: 113 MMHG | HEIGHT: 68 IN | OXYGEN SATURATION: 99 % | TEMPERATURE: 97 F | DIASTOLIC BLOOD PRESSURE: 82 MMHG | BODY MASS INDEX: 28.04 KG/M2

## 2023-11-26 DIAGNOSIS — J02.9 VIRAL PHARYNGITIS: Primary | ICD-10-CM

## 2023-11-26 LAB — S PYO AG THROAT QL: NEGATIVE

## 2023-11-26 NOTE — DISCHARGE INSTRUCTIONS
Continues ibuprofen and Tylenol as needed  Warm salt water gargles to help with pain  Follow-up with primary care doctor  Return to the ER symptoms worsen

## 2023-11-28 ENCOUNTER — PATIENT MESSAGE (OUTPATIENT)
Dept: FAMILY MEDICINE CLINIC | Facility: CLINIC | Age: 38
End: 2023-11-28

## 2023-11-28 DIAGNOSIS — Z12.31 ENCOUNTER FOR SCREENING MAMMOGRAM FOR HIGH-RISK PATIENT: Primary | ICD-10-CM

## 2023-11-28 NOTE — TELEPHONE ENCOUNTER
Please see pt's [facility]    12/21/22 - diagnostic mammogram completed  Recommendations: US biopsy right breast    12/28/23 - US Right breast biopsy    \"Impression:  Pathology is reported as benign, with findings concordant with the imaging assessment. Clinical follow-up and management recommended for the patient's breast pain. If there is no change in the clinical breast exam, recommend annual screening mammography starting at age 36 or sooner if clinically indicated. \"      Order(s) pending, please review. Thank you.

## 2023-11-28 NOTE — TELEPHONE ENCOUNTER
From: Alyse Self  To: Nicolas Laurent  Sent: 11/28/2023 10:06 AM CST  Subject: Breast Biopsy Follow up     Hello,  I just received a letter stating that it has been about a year since my breast biopsy and I need to schedule a biopsy follow up mammogram and ultrasound. Can you please put an order in for both so I may call central scheduling and get an appointment?      Thank you,    Laurel Rosa

## 2023-11-29 ENCOUNTER — OFFICE VISIT (OUTPATIENT)
Dept: FAMILY MEDICINE CLINIC | Facility: CLINIC | Age: 38
End: 2023-11-29
Payer: COMMERCIAL

## 2023-11-29 ENCOUNTER — TELEPHONE (OUTPATIENT)
Dept: FAMILY MEDICINE CLINIC | Facility: CLINIC | Age: 38
End: 2023-11-29

## 2023-11-29 ENCOUNTER — HOSPITAL ENCOUNTER (OUTPATIENT)
Dept: MAMMOGRAPHY | Age: 38
Discharge: HOME | End: 2023-11-29
Attending: FAMILY MEDICINE
Payer: COMMERCIAL

## 2023-11-29 VITALS
OXYGEN SATURATION: 97 % | HEART RATE: 73 BPM | TEMPERATURE: 98 F | DIASTOLIC BLOOD PRESSURE: 70 MMHG | WEIGHT: 185 LBS | RESPIRATION RATE: 18 BRPM | SYSTOLIC BLOOD PRESSURE: 108 MMHG | HEIGHT: 68 IN | BODY MASS INDEX: 28.04 KG/M2

## 2023-11-29 DIAGNOSIS — Z12.31 ENCOUNTER FOR SCREENING MAMMOGRAM FOR HIGH-RISK PATIENT: ICD-10-CM

## 2023-11-29 DIAGNOSIS — K12.0 APHTHOUS ULCER: Primary | ICD-10-CM

## 2023-11-29 PROCEDURE — 3008F BODY MASS INDEX DOCD: CPT | Performed by: FAMILY MEDICINE

## 2023-11-29 PROCEDURE — 77063 BREAST TOMOSYNTHESIS BI: CPT | Performed by: FAMILY MEDICINE

## 2023-11-29 PROCEDURE — 3074F SYST BP LT 130 MM HG: CPT | Performed by: FAMILY MEDICINE

## 2023-11-29 PROCEDURE — 99213 OFFICE O/P EST LOW 20 MIN: CPT | Performed by: FAMILY MEDICINE

## 2023-11-29 PROCEDURE — 77067 SCR MAMMO BI INCL CAD: CPT | Performed by: FAMILY MEDICINE

## 2023-11-29 PROCEDURE — 3078F DIAST BP <80 MM HG: CPT | Performed by: FAMILY MEDICINE

## 2023-11-29 RX ORDER — PREDNISONE 20 MG/1
40 TABLET ORAL DAILY
Qty: 10 TABLET | Refills: 0 | Status: SHIPPED | OUTPATIENT
Start: 2023-11-29 | End: 2023-12-04

## 2023-11-30 ENCOUNTER — MED REC SCAN ONLY (OUTPATIENT)
Dept: FAMILY MEDICINE CLINIC | Facility: CLINIC | Age: 38
End: 2023-11-30

## 2024-02-17 RX ORDER — VALACYCLOVIR HYDROCHLORIDE 1 G/1
TABLET, FILM COATED ORAL
Qty: 16 TABLET | Refills: 3 | Status: SHIPPED | OUTPATIENT
Start: 2024-02-17

## 2024-02-17 NOTE — TELEPHONE ENCOUNTER
Herpes Agent Protocol [id number]/17/2024 10:36 AM   Protocol Details In person appointment or virtual visit in the past 12 mos or appointment in next 3 mos           LOV 7/5/23     Last Refill 11/18/22 #16 Refill 3     No future appointments.

## 2024-02-19 ENCOUNTER — OFFICE VISIT (OUTPATIENT)
Dept: FAMILY MEDICINE CLINIC | Facility: CLINIC | Age: 39
End: 2024-02-19
Payer: COMMERCIAL

## 2024-02-19 VITALS
DIASTOLIC BLOOD PRESSURE: 75 MMHG | WEIGHT: 190 LBS | BODY MASS INDEX: 28.79 KG/M2 | SYSTOLIC BLOOD PRESSURE: 112 MMHG | RESPIRATION RATE: 18 BRPM | HEART RATE: 80 BPM | OXYGEN SATURATION: 97 % | HEIGHT: 68 IN | TEMPERATURE: 98 F

## 2024-02-19 DIAGNOSIS — J01.10 ACUTE NON-RECURRENT FRONTAL SINUSITIS: Primary | ICD-10-CM

## 2024-02-19 LAB
OPERATOR ID: NORMAL
POCT LOT NUMBER: NORMAL
RAPID SARS-COV-2 BY PCR: NOT DETECTED

## 2024-02-19 RX ORDER — PREDNISONE 20 MG/1
40 TABLET ORAL DAILY
Qty: 10 TABLET | Refills: 0 | Status: SHIPPED | OUTPATIENT
Start: 2024-02-19 | End: 2024-02-24

## 2024-02-19 RX ORDER — CEFDINIR 300 MG/1
300 CAPSULE ORAL 2 TIMES DAILY
Qty: 20 CAPSULE | Refills: 0 | Status: SHIPPED | OUTPATIENT
Start: 2024-02-19 | End: 2024-02-29

## 2024-02-19 NOTE — PROGRESS NOTES
CHIEF COMPLAINT:     Chief Complaint   Patient presents with    Cold     Started over 2 weeks ago  Chills body aches, nasal congestion   Right ear clogged  no fevers          HPI:   Monalisa Moralez is a 38 year old female who presents for upper respiratory symptoms for   over 17  days. Patient reports congestion, ear pain, sinus pain. Symptoms have been worsening since onset.  Treating symptoms with otc medication. Pt has strong history of sinus infections and sinus surgeries. D/t past hx if pt needs medications should would like steroids first and if no improvement then start abx.      Current Outpatient Medications   Medication Sig Dispense Refill    valACYclovir 1 G Oral Tab TAKE 2 TABLETS BY MOUTH EVERY 12 HOURS FOR 2 DOSES AS NEEDED FOR EACH EPISODE OF COLD SORE. START AT ONSET 16 tablet 3    albuterol 108 (90 Base) MCG/ACT Inhalation Aero Soln Inhale 2 puffs into the lungs as needed. 1 each 0    Omega-3 1000 MG Oral Cap Take 1 capsule by mouth As Directed.      FLUoxetine 20 MG Oral Cap Take 1 capsule (20 mg total) by mouth daily. (Patient not taking: Reported on 2/19/2024) 90 capsule 0    methylPREDNISolone (MEDROL) 4 MG Oral Tablet Therapy Pack Dosepack: take as directed (Patient not taking: Reported on 2/19/2024) 1 each 0    Naproxen Sodium (ALEVE OR) Take by mouth. (Patient not taking: Reported on 2/19/2024)        Past Medical History:   Diagnosis Date    Allergic rhinitis     Asthma     Extrinsic asthma, unspecified     exercise induced      Past Surgical History:   Procedure Laterality Date    ADENOIDECTOMY      KAYLEIGH BIOPSY STEREO NODULE 1 SITE RIGHT (CPT=19081)  12/2022    fibroadenoma    REMOVAL GALLBLADDER      SINUS SURGERY        TONSILLECTOMY           Social History     Socioeconomic History    Marital status:    Tobacco Use    Smoking status: Never    Smokeless tobacco: Never   Vaping Use    Vaping Use: Never used   Substance and Sexual Activity    Alcohol use: Yes     Alcohol/week:  0.0 standard drinks of alcohol    Drug use: No    Sexual activity: Yes     Partners: Male     Birth control/protection: Vasectomy   Other Topics Concern    Caffeine Concern Yes     Comment: 8oz cup daily    Exercise No    Seat Belt Yes         REVIEW OF SYSTEMS:   GENERAL: normal appetite  SKIN: no rashes or abnormal skin lesions  HEENT: See HPI  LUNGS: See HPI  CARDIOVASCULAR: denies chest pain or palpitations   GI: denies N/V/C or abdominal pain      EXAM:   /75   Pulse 80   Temp 98.2 °F (36.8 °C)   Resp 18   Ht 5' 8\" (1.727 m)   Wt 190 lb (86.2 kg)   LMP 01/30/2024 (Exact Date)   SpO2 97%   Breastfeeding No   BMI 28.89 kg/m²   GENERAL: well developed, well nourished,in no apparent distress  SKIN: no rashes,no suspicious lesions  HEAD: atraumatic, normocephalic.  pos tenderness on palpation of frontal sinuses  EYES: conjunctiva clear, EOM intact  EARS: TM's pearly, no bulging, no retraction,no fluid, bony landmarks visible  NOSE: Nostrils patent, + nasal discharge, nasal mucosa red and inflamed   THROAT: Oral mucosa pink, moist. Posterior pharynx is not erythematous. no exudates. Tonsils 0/4.    NECK: Supple, non-tender  LUNGS: clear to auscultation bilaterally, no wheezes or rhonchi. Breathing is non labored.  CARDIO: RRR without murmur  EXTREMITIES: no cyanosis, clubbing or edema  LYMPH:  no lymphadenopathy.        ASSESSMENT AND PLAN:   Monalisa Moralez is a 38 year old female who presents with upper respiratory symptoms that are consistent with    ASSESSMENT:   Encounter Diagnosis   Name Primary?    Acute non-recurrent frontal sinusitis Yes       PLAN: Meds as below.  Comfort care as described in Patient Instructions  Educated on medications- pt would like to try steroids first before jumping to abx- pt has had multiple sinus surgeries in the past  Educated on supportive measures: ibuprofen/tylenol, hydration, delsym for cough if needed  Educated on s/s of worsening sx and when to seek higher  level of care  Follow up with pcp if not improving      Meds & Refills for this Visit:  Requested Prescriptions      No prescriptions requested or ordered in this encounter     Risks, benefits, and side effects of medication explained and discussed.    The patient indicates understanding of these issues and agrees to the plan.  The patient is asked to f/u with PCP if sx's persist or worsen.  There are no Patient Instructions on file for this visit.

## 2024-04-11 ENCOUNTER — OFFICE VISIT (OUTPATIENT)
Dept: OBGYN CLINIC | Facility: CLINIC | Age: 39
End: 2024-04-11
Payer: COMMERCIAL

## 2024-04-11 ENCOUNTER — PATIENT MESSAGE (OUTPATIENT)
Dept: OBGYN CLINIC | Facility: CLINIC | Age: 39
End: 2024-04-11

## 2024-04-11 VITALS
BODY MASS INDEX: 29.1 KG/M2 | HEART RATE: 82 BPM | DIASTOLIC BLOOD PRESSURE: 74 MMHG | SYSTOLIC BLOOD PRESSURE: 104 MMHG | HEIGHT: 68 IN | WEIGHT: 192 LBS

## 2024-04-11 DIAGNOSIS — R10.2 PELVIC PAIN: Primary | ICD-10-CM

## 2024-04-11 DIAGNOSIS — N76.1 CHRONIC VAGINITIS: ICD-10-CM

## 2024-04-11 PROCEDURE — 99213 OFFICE O/P EST LOW 20 MIN: CPT | Performed by: NURSE PRACTITIONER

## 2024-04-11 PROCEDURE — 3074F SYST BP LT 130 MM HG: CPT | Performed by: NURSE PRACTITIONER

## 2024-04-11 PROCEDURE — 81514 NFCT DS BV&VAGINITIS DNA ALG: CPT | Performed by: NURSE PRACTITIONER

## 2024-04-11 PROCEDURE — 3078F DIAST BP <80 MM HG: CPT | Performed by: NURSE PRACTITIONER

## 2024-04-11 PROCEDURE — 3008F BODY MASS INDEX DOCD: CPT | Performed by: NURSE PRACTITIONER

## 2024-04-11 RX ORDER — TESTOSTERONE MICRONIZED 100 %
POWDER (GRAM) MISCELLANEOUS
COMMUNITY
Start: 2024-03-25

## 2024-04-12 LAB
BV BACTERIA DNA VAG QL NAA+PROBE: NEGATIVE
C GLABRATA DNA VAG QL NAA+PROBE: NEGATIVE
C KRUSEI DNA VAG QL NAA+PROBE: NEGATIVE
CANDIDA DNA VAG QL NAA+PROBE: NEGATIVE
T VAGINALIS DNA VAG QL NAA+PROBE: NEGATIVE

## 2024-04-15 NOTE — PROGRESS NOTES
Gyne note     S: patient is a 39 year old yo  here for recurrent episode of pelvic pain which is her presenting symptoms of bacterial vaginosis. This has been ongoing around 1 year. She had a normal pelvic ultrasound 2023 in the ED. She is currently using a compounded treatment given to her from a different provider.     She has had persistent and chronic health concerns most associated with inflammation since the delivery of her last child. She notes she had endometritis after her delivery.    She has seen integrative medicine and had numerous tests done which did show some hormonal abnormalities. She also feels her overall immunity is significantly low, she has had increased hair loss as well. She has thyroid nodules but thyroid function has been normal.     She has completed some pelvic floor PT in early  for incontinence and diastasis rectus.    Review of Systems:  General: denies fevers, chills, fatigue and malaise.     O:/74   Pulse 82   Ht 68\"   Wt 192 lb (87.1 kg)   LMP 2024 (Exact Date)   BMI 29.19 kg/m²   Gen NAD     GYNE/: External Genitalia: Normal appearing, no lesions. Urethral meatus appear wnl, no abnormal discharge or lesions noted.                                Vagina: normal pink mucosa, no lesions, normal clear discharge.                     Cervix: parous, no lesions                             A/P:  1. Pelvic pain  - Vaginitis Vaginosis PCR Panel; Future  - Vaginitis Vaginosis PCR Panel    I recommended she consider going to the vulvar/ vaginal clinic at [facility] or any other specialty clinic similar

## 2024-04-25 ENCOUNTER — OFFICE VISIT (OUTPATIENT)
Dept: FAMILY MEDICINE CLINIC | Facility: CLINIC | Age: 39
End: 2024-04-25
Payer: COMMERCIAL

## 2024-04-25 VITALS
OXYGEN SATURATION: 97 % | DIASTOLIC BLOOD PRESSURE: 74 MMHG | BODY MASS INDEX: 28.79 KG/M2 | RESPIRATION RATE: 16 BRPM | SYSTOLIC BLOOD PRESSURE: 118 MMHG | WEIGHT: 190 LBS | HEIGHT: 68 IN | TEMPERATURE: 98 F

## 2024-04-25 DIAGNOSIS — H66.92 ACUTE LEFT OTITIS MEDIA: Primary | ICD-10-CM

## 2024-04-25 PROCEDURE — 99213 OFFICE O/P EST LOW 20 MIN: CPT | Performed by: PHYSICIAN ASSISTANT

## 2024-04-25 PROCEDURE — 3008F BODY MASS INDEX DOCD: CPT | Performed by: PHYSICIAN ASSISTANT

## 2024-04-25 PROCEDURE — 3078F DIAST BP <80 MM HG: CPT | Performed by: PHYSICIAN ASSISTANT

## 2024-04-25 PROCEDURE — 3074F SYST BP LT 130 MM HG: CPT | Performed by: PHYSICIAN ASSISTANT

## 2024-04-25 RX ORDER — CEFDINIR 300 MG/1
300 CAPSULE ORAL 2 TIMES DAILY
Qty: 20 CAPSULE | Refills: 0 | Status: SHIPPED | OUTPATIENT
Start: 2024-04-25 | End: 2024-04-25

## 2024-04-25 RX ORDER — AMOXICILLIN AND CLAVULANATE POTASSIUM 875; 125 MG/1; MG/1
1 TABLET, FILM COATED ORAL 2 TIMES DAILY
Qty: 20 TABLET | Refills: 0 | Status: SHIPPED | OUTPATIENT
Start: 2024-04-25 | End: 2024-05-05

## 2024-04-25 NOTE — PATIENT INSTRUCTIONS
Follow up with your primary care provider if your symptoms fail to improve and resolve as anticipated    Go to the Immediate Care or Emergency Department in event of new or worsening symptoms at any time

## 2024-04-25 NOTE — PROGRESS NOTES
CHIEF COMPLAINT:     Chief Complaint   Patient presents with    Ear Pain     3 days, started on left now in both sides, pain pressure, mufled hearing       HPI:   Monalisa Moralez is a 39 year old female who presents for c/o everett ear pain x 3 days, L>R.   L ear pain progressively worsening over course.  L ear pressure, congestion, muffled hearing.   Associated with mild URI sx.  Prior h/o sinus surgery, tonsillectomy, and adenoidectomy and frequent ear infections as a child.  No h/o PE Tubes.   Denies fever, no tinnitus, no chills/sweats, no drainage, no pain with opening/closing jaw.   Mild lh/dizziness with increased congestion/pressure in ears.   OTC APAP/IBU without relief, topical garlic gtts without relief.   Nasal saline irrigation prn without relief.     Pt reports h/o GI upset with cefdinir, reports last Rx (2 months ago) had to request alternative switch to Augmentin due to GI upset.  Reports tolerated Augmentin well without reaction.  Requests Augmentin for OM tx as opposed to cefdinir.      Notes prior h/o Augmentin allergy noted due to h/o rash on neck. However, reports has experienced same recurrent rash to neck every time she becomes ill, including viral infections, since having COVID-19.  Suspects more viral exanthem as opposed to allergy to medication and wishes to have Amox/Amoxi-clav removed from allergy list.   Denies h/o anaphylaxis.   Allergy list updated.       Current Outpatient Medications   Medication Sig Dispense Refill    amoxicillin clavulanate 875-125 MG Oral Tab Take 1 tablet by mouth 2 (two) times daily for 10 days. 20 tablet 0    Testosterone Does not apply Powder       valACYclovir 1 G Oral Tab TAKE 2 TABLETS BY MOUTH EVERY 12 HOURS FOR 2 DOSES AS NEEDED FOR EACH EPISODE OF COLD SORE. START AT ONSET 16 tablet 3    FLUoxetine 20 MG Oral Cap Take 1 capsule (20 mg total) by mouth daily. (Patient not taking: Reported on 2/19/2024) 90 capsule 0    methylPREDNISolone (MEDROL) 4 MG Oral  Tablet Therapy Pack Dosepack: take as directed (Patient not taking: Reported on 2/19/2024) 1 each 0    Naproxen Sodium (ALEVE OR) Take by mouth. (Patient not taking: Reported on 2/19/2024)      albuterol 108 (90 Base) MCG/ACT Inhalation Aero Soln Inhale 2 puffs into the lungs as needed. 1 each 0    Omega-3 1000 MG Oral Cap Take 1 capsule by mouth As Directed.        Past Medical History:    Allergic rhinitis    Asthma (HCC)    Extrinsic asthma, unspecified    exercise induced      Past Surgical History:   Procedure Laterality Date    Adenoidectomy      Nicki biopsy stereo nodule 1 site right (cpt=19081)  12/2022    fibroadenoma    Removal gallbladder      Sinus surgery        Tonsillectomy           Social History     Socioeconomic History    Marital status:    Tobacco Use    Smoking status: Never    Smokeless tobacco: Never   Vaping Use    Vaping status: Never Used   Substance and Sexual Activity    Alcohol use: Yes     Alcohol/week: 0.0 standard drinks of alcohol    Drug use: No    Sexual activity: Yes     Partners: Male     Birth control/protection: Vasectomy   Other Topics Concern    Caffeine Concern Yes     Comment: 8oz cup daily    Exercise No    Seat Belt Yes     Social Determinants of Health      Received from [facility], [facility]    Social Connections    Received from [facility], [facility]    Housing Stability         REVIEW OF SYSTEMS:   GENERAL: normal appetite  SKIN: no rashes or abnormal skin lesions  HEENT: See HPI  LUNGS: See HPI  CARDIOVASCULAR: denies chest pain or palpitations   GI: denies N/V/C or abdominal pain      EXAM:   /74   Temp 98 °F (36.7 °C)   Resp 16   Ht 5' 8\" (1.727 m)   Wt 190 lb (86.2 kg)   LMP 04/21/2024 (Exact Date)   SpO2 97%   BMI 28.89 kg/m²   GENERAL: well developed, well nourished,in no apparent distress  SKIN: no rashes,no suspicious lesions  HEAD: atraumatic, normocephalic.   no tenderness on palpation of  sinuses  EYES: conjunctiva clear, EOM intact  EARS: L TM injected/retracted and dull, R TM pearly gray/clear with air/fluid levels, no mastoid ttp. /  NOSE: Nostrils patent, clear nasal discharge, nasal mucosa erythematous/edematous   THROAT: Oral mucosa pink, moist. Posterior pharynx is non erythematous. without exudates. Tonsils surgically absent, uvula midline.     NECK: Supple, non-tender  LUNGS: clear to auscultation bilaterally, no wheezes or rhonchi. Breathing is non labored.  CARDIO: RRR without murmur  EXTREMITIES: no cyanosis, clubbing or edema  LYMPH:  shotty ant cervical LAD.   ASSESSMENT AND PLAN:   Monalisa Moralez is a 39 year old female who presents with upper respiratory symptoms that are consistent with    ASSESSMENT:   Encounter Diagnosis   Name Primary?    Acute left otitis media Yes       PLAN: Meds as below.  Comfort care as described in Patient Instructions.    Reviewed s/sx of allergic reaction/anaphylaxis and to call 911 or go to ED in event of systemic sx including new onset rash, blisters, facial swelling, dysphagia, itchy mucous membranes including palate/throat, CP, SOB/VALENCIA, n/v.   Patient voiced understanding.        Meds & Refills for this Visit:  Requested Prescriptions     Signed Prescriptions Disp Refills    amoxicillin clavulanate 875-125 MG Oral Tab 20 tablet 0     Sig: Take 1 tablet by mouth 2 (two) times daily for 10 days.     Risks, benefits, and side effects of medication explained and discussed.    The patient indicates understanding of these issues and agrees to the plan.  The patient is asked to f/u with PCP if sx's persist or worsen.      Lashawn Casas PA-C

## 2024-05-02 ENCOUNTER — MED REC SCAN ONLY (OUTPATIENT)
Dept: FAMILY MEDICINE CLINIC | Facility: CLINIC | Age: 39
End: 2024-05-02

## 2024-06-28 ENCOUNTER — TELEPHONE (OUTPATIENT)
Dept: FAMILY MEDICINE CLINIC | Facility: CLINIC | Age: 39
End: 2024-06-28

## 2024-06-28 NOTE — TELEPHONE ENCOUNTER
Called and spoke with pt - inquiring which office of Dr. Acosta she went to so RN can call for pre-op request - pt reports she has not met Dr. Acosta, she was referred to him by Obgyn ([facility])    Called [facility]ILL[facility] ph#[phone] - left detailed message for Dr. Dickey's MA - requesting to fax over pre-op request (procedure, diagnosis, anesthesia, labs, etc?), our office ph# and fax# provided    Awaiting call back/fax

## 2024-06-28 NOTE — TELEPHONE ENCOUNTER
Patient having surgery 7/16/24    Dr uzair loaiza at [facility]  Vascular Embolization    Patient told labs would be done day of surgery  No EKG  No chest xray    H&P scheduled with Marii Castro due to timing    Please adv  Thank you

## 2024-06-28 NOTE — TELEPHONE ENCOUNTER
Called and spoke with pt - she reports she was advised by Dr. Acosta's  that no pre-op orders required, no pre-op paperwork was given to pt    Pt provided Dr. Acosta's  ph#: Karma, ph#[phone]  Left detailed message to VM - requesting pre-op request orders/fax, our office ph# and fax# provided    Awaiting call back/fax    Future Appointments   Date Time Provider Department Center   7/1/2024 11:20 AM Marii Castro APRN [facility] [place]     Routing as [facility]

## 2024-07-01 ENCOUNTER — OFFICE VISIT (OUTPATIENT)
Dept: FAMILY MEDICINE CLINIC | Facility: CLINIC | Age: 39
End: 2024-07-01
Payer: COMMERCIAL

## 2024-07-01 VITALS
HEART RATE: 75 BPM | RESPIRATION RATE: 18 BRPM | HEIGHT: 68 IN | OXYGEN SATURATION: 98 % | WEIGHT: 188.19 LBS | BODY MASS INDEX: 28.52 KG/M2 | DIASTOLIC BLOOD PRESSURE: 78 MMHG | TEMPERATURE: 98 F | SYSTOLIC BLOOD PRESSURE: 117 MMHG

## 2024-07-01 DIAGNOSIS — L65.9 HAIR LOSS: ICD-10-CM

## 2024-07-01 DIAGNOSIS — E04.1 THYROID NODULE: ICD-10-CM

## 2024-07-01 DIAGNOSIS — N94.89 PELVIC CONGESTION SYNDROME: ICD-10-CM

## 2024-07-01 DIAGNOSIS — Z01.818 PREOP EXAMINATION: Primary | ICD-10-CM

## 2024-07-01 LAB
DEPRECATED HBV CORE AB SER IA-ACNC: 49.5 NG/ML
IRON SATN MFR SERPL: 17 %
IRON SERPL-MCNC: 58 UG/DL
TIBC SERPL-MCNC: 349 UG/DL (ref 240–450)
TRANSFERRIN SERPL-MCNC: 234 MG/DL (ref 200–360)

## 2024-07-01 PROCEDURE — 3008F BODY MASS INDEX DOCD: CPT | Performed by: NURSE PRACTITIONER

## 2024-07-01 PROCEDURE — 99243 OFF/OP CNSLTJ NEW/EST LOW 30: CPT | Performed by: NURSE PRACTITIONER

## 2024-07-01 PROCEDURE — 3078F DIAST BP <80 MM HG: CPT | Performed by: NURSE PRACTITIONER

## 2024-07-01 PROCEDURE — 83540 ASSAY OF IRON: CPT | Performed by: NURSE PRACTITIONER

## 2024-07-01 PROCEDURE — 82728 ASSAY OF FERRITIN: CPT | Performed by: NURSE PRACTITIONER

## 2024-07-01 PROCEDURE — 3074F SYST BP LT 130 MM HG: CPT | Performed by: NURSE PRACTITIONER

## 2024-07-01 PROCEDURE — 83550 IRON BINDING TEST: CPT | Performed by: NURSE PRACTITIONER

## 2024-07-01 NOTE — PROGRESS NOTES
Monalisa Moralez is a 39 year old female who presents for a pre-operative physical exam.     Monalisa Moralez is scheduled for a vascular embolization or occlusion procedure at [facility] on 07/16/2024 performed by Dr. Aocsta.     Indication: Pelvic congestion, left ovarian vein    Type of Anesthesia: General    HPI related to surgery:     Monalisa is a 39 year old female with a Body mass index is 28.62 kg/m². who presents today for a preop exam.    Monalisa reports gradually worsening lower left sided abdominal pain.  Sought care with Dr. Isael Eller with [facility] who then referred her to Dr. Acosta.  Denies changes of menstrual cycle, increased vaginal discharge, or dysuria.    She  has had previous anesthesia:  Yes.    Previous complications:  Yes, at the age of 21 became combative upon.    Denies bleeding problems/clotting disorders and malignancy  Denies hx of myocardial infarction, cardiac stent, and hypertension  Last asthma attack more than 8 years ago, exercise induced  Denies uncontrolled diabetes and hypothyroidism  Denies history of HIV or hepatitis     Social History     Socioeconomic History    Marital status:    Tobacco Use    Smoking status: Never    Smokeless tobacco: Never   Vaping Use    Vaping status: Never Used   Substance and Sexual Activity    Alcohol use: Not Currently     Comment: rarely    Drug use: No    Sexual activity: Yes     Partners: Male     Birth control/protection: Vasectomy   Other Topics Concern    Caffeine Concern Yes     Comment: 8oz cup daily    Exercise No    Seat Belt Yes     Social Determinants of Health      Received from [facility] [place], [facility] [place] [place]    Social Connections    Received from [facility] [place], [facility] [place] [place]    Housing Stability      Past Medical History:    Allergic rhinitis    Asthma (HCC)    Extrinsic asthma, unspecified    exercise induced     Past Surgical History:   Procedure  Laterality Date    Adenoidectomy      Nicki biopsy stereo nodule 1 site right (cpt=19081)  12/2022    fibroadenoma    Removal gallbladder      Sinus surgery        Tonsillectomy       Allergies:   Allergies   Allergen Reactions    Diazepam HIVES, OTHER (SEE COMMENTS) and RASH    Valium     Diltiazem Hcl RASH     Denies allergies to latex or adhesives   Current Outpatient Medications   Medication Sig Dispense Refill    valACYclovir 1 G Oral Tab TAKE 2 TABLETS BY MOUTH EVERY 12 HOURS FOR 2 DOSES AS NEEDED FOR EACH EPISODE OF COLD SORE. START AT ONSET 16 tablet 3    albuterol 108 (90 Base) MCG/ACT Inhalation Aero Soln Inhale 2 puffs into the lungs as needed. 1 each 0    Omega-3 1000 MG Oral Cap Take 1 capsule by mouth As Directed.          REVIEW OF SYSTEMS:     GENERAL:  Denies fever or unintentional weight loss   HEENT:  Denies jaw pain, tooth pain or loose teeth  RESPIRATORY:  Denies upper respiratory infection or cough  CARDIAC:  Denies chest pain with exertion  GI:  Denies nausea, vomiting, diarrhea, constipation, or blood in stool  :  Denies blood in urine or painful urination  NEURO:  Denies recent falls   MSKL:  Denies joint stiffness or pain  SKIN:  Denies change in texture of moles   PSYCH: Denies thoughts of self harm or harming others    PHYSICAL EXAM:   /78   Pulse 75   Temp 97.7 °F (36.5 °C)   Resp 18   Ht 5' 8\" (1.727 m)   Wt 188 lb 3.2 oz (85.4 kg)   LMP 04/21/2024 (Exact Date)   SpO2 98%   BMI 28.62 kg/m²      Constitutional:     Appearance: Normal appearance.  Sitting upright on exam table.  Well developed, well nourished, and in no acute distress.  HEENT:      Grossly normal hearing.       Head: Facial features symmetric.      Right Ear: Canal clear without erythema or drainage.  TM clear and intact, neutral in position.      Left Ear: Canal clear without erythema or drainage.  TM clear and intact, neutral in position.      Nose: Nose normal. Nares patent bilaterally.     Mouth/Throat:  Buccal mucosa is moist.  Uvula rises midline.  Posterior pharynx without erythema.     Extraocular Movements: Extraocular movements intact.      Conjunctiva/sclera: Conjunctivae normal. Sclera anicteric         Pupils: Pupils are equal, round, and reactive to light.   Neck:     Neck is supple. Trachea is midline.  Cardiovascular:      Rate and Rhythm: Normal rate and regular rhythm.      Heart sounds: Normal heart sounds. No murmur heard.     Extremities are without edema  Pulmonary:      Effort: Pulmonary effort is normal.      Breath sounds: Lungs clear throughout.     No cough or wheezing   Abdominal:      General: Abdomen is nondistended.  Normoactive bowel sounds.  LLQ semi-firm, tender.   Musculoskeletal:        General: Normal range of motion. Shoulders are symmetric in height.  Strength of extremities are equal bilaterally.     Range of motion without limitations.   Skin:     General: Skin is warm and dry.      Coloration: Skin is not jaundiced.      Findings: No bruising or rashes.   Neurological:      General: No focal deficit present. Speech is clear and organized.     Mental Status: Alert and oriented to person, place, and time.      Sensory: Sensation is intact.     Motor: Motor function is intact. Movements are smooth and controlled without ataxia.     Coordination: Coordination is intact. Coordination normal.      Gait: Gait is intact. Gait steady and nonantalgic.      Deep Tendon Reflexes: Reflexes 2+ bilaterally.  Psychiatric:         Mood and Affect: Mood normal.         Behavior: Behavior normal.         Thought Content: Thought content normal.         Judgment: Judgment normal.       LABORATORY DATA:     Please not majority of laboratory values are greater than 30 days ago    Lab Results   Component Value Date    WBC 7.8 08/04/2023    RBC 5.23 08/04/2023    HGB 15.2 08/04/2023    HCT 45.3 08/04/2023    .0 08/04/2023    MCV 86.6 08/04/2023    MCH 29.1 08/04/2023    MCHC 33.6 08/04/2023     RDW 13.0 08/04/2023    NEPRELIM 4.68 08/04/2023    NEPERCENT 59.9 08/04/2023    LYPERCENT 30.8 08/04/2023    MOPERCENT 5.8 08/04/2023    EOPERCENT 2.6 08/04/2023    BAPERCENT 0.8 08/04/2023    NE 4.68 08/04/2023    LYMABS 2.40 08/04/2023    MOABSO 0.45 08/04/2023    EOABSO 0.20 08/04/2023    BAABSO 0.06 08/04/2023     Lab Results   Component Value Date    GLU 89 08/04/2023    BUN 11 08/04/2023    BUNCREA 13.3 11/08/2019    CREATSERUM 0.76 08/04/2023    ANIONGAP 2 08/04/2023    GFRNAA 96 10/21/2021    GFRAA 111 10/21/2021    CA 8.6 08/04/2023    OSMOCALC 281 08/04/2023    ALKPHO 69 08/04/2023    AST 12 (L) 08/04/2023    ALT 22 08/04/2023    BILT 0.5 08/04/2023    TP 7.8 08/04/2023    ALB 4.1 08/04/2023    GLOBULIN 3.7 08/04/2023     08/04/2023    K 4.2 08/04/2023     08/04/2023    CO2 27.0 08/04/2023     Lab Results   Component Value Date    IRON 58 07/01/2024       ASSESSMENT AND PLAN:   Monalisa Moralez has a history of SVT and asthma otherwise     History of SVT around the year of 2008.  Intermittent palpitations.  Previously treated with beta blocker which she states dropped her heart rate too low making her feel unwell.  Palpitations have lessened in frequency after she established an exercise routine, cross fit exercises. Denies near fainting or left sided chest pain.  Denies activity intolerance.    Asthma, uncomplicated.  Exercise induced.  Reports last asthma exacerbation greater than 8 years ago.  Denies wheezing or activity intolerance.    She is a good surgical candidate.     Assessment:  Encounter Diagnoses   Name Primary?    Preop examination Yes    Pelvic congestion syndrome     Hair loss     Thyroid nodule          Plan   Orders Placed This Encounter   Procedures    Ferritin    Iron And Tibc    VENIPUNCTURE     MEDICALLY CLEAR FOR SURGERY    Marii Castro APRN

## 2024-07-01 NOTE — TELEPHONE ENCOUNTER
Called [facility] ph#[phone] - staff advised that pt was seen by Beverly Andrew NP - left detailed message to  that pt coming in today for pre-op for procedure with Dr. Acosta - requesting pre-op request/orders, our office ph# and fax# provided  Awaiting call back / fax

## 2024-07-02 PROBLEM — N94.89 PELVIC CONGESTION SYNDROME: Status: ACTIVE | Noted: 2024-07-02

## 2024-07-03 ENCOUNTER — TELEPHONE (OUTPATIENT)
Dept: FAMILY MEDICINE CLINIC | Facility: CLINIC | Age: 39
End: 2024-07-03

## 2024-07-03 NOTE — TELEPHONE ENCOUNTER
Pre op information sent to Dr. Eller at [facility].    Pre admission testing at [facility] Dr. Jonathan Acosta

## 2024-07-08 NOTE — TELEPHONE ENCOUNTER
Karma states she never received fax.  Can you re-fax to:    [phone]    Put attn:  Karma    Thank you!

## 2024-07-09 ENCOUNTER — TELEPHONE (OUTPATIENT)
Dept: FAMILY MEDICINE CLINIC | Facility: CLINIC | Age: 39
End: 2024-07-09

## 2024-07-09 NOTE — TELEPHONE ENCOUNTER
[facility] does require a doctor sign off on H&P    Please ask a provider to sign off and refax    Fax: [phone]    Thank you

## 2024-07-10 NOTE — TELEPHONE ENCOUNTER
Pre-op cosigned by Dr. Hernandez    Re-faxed to #[phone]    Called  [phone] - advised Karma that fax sent to above fax# - she v/u, no further questions

## 2024-07-31 ENCOUNTER — HOSPITAL ENCOUNTER (OUTPATIENT)
Dept: ULTRASOUND IMAGING | Age: 39
Discharge: HOME | End: 2024-07-31
Attending: NURSE PRACTITIONER
Payer: COMMERCIAL

## 2024-07-31 DIAGNOSIS — E04.1 THYROID NODULE: ICD-10-CM

## 2024-07-31 PROCEDURE — 76536 US EXAM OF HEAD AND NECK: CPT | Performed by: NURSE PRACTITIONER

## 2024-08-06 ENCOUNTER — PATIENT MESSAGE (OUTPATIENT)
Dept: FAMILY MEDICINE CLINIC | Facility: CLINIC | Age: 39
End: 2024-08-06

## 2024-08-06 NOTE — TELEPHONE ENCOUNTER
[employer] message sent - I wanted to reach out about the thyroid nodules and fine needle aspiration (FNA). I was most concerned about your TSH being slightly on the lower end (and concerned about possible subclinical hyperthyroidism). However, from what I've read, there isn't much to do other than continue to monitor your thyroid levels and nodules. So I agree with the endocrinologist by waiting it out rather than jumping into the fine needle aspiration. If your nodules become larger than 1.5cm then a FNA would be more appropriate. Sorry for the confusion. If you begin to experience worsening anxiety, tremor, palpitations, heat intolerance, increased perspiration, and weight loss please let us know so we can repeat testing. Otherwise, we will continue to assess yearly with lab work and thyroid ultrasounds.

## 2025-01-21 ENCOUNTER — OFFICE VISIT (OUTPATIENT)
Dept: FAMILY MEDICINE CLINIC | Facility: CLINIC | Age: 40
End: 2025-01-21
Payer: COMMERCIAL

## 2025-01-21 VITALS
BODY MASS INDEX: 26 KG/M2 | RESPIRATION RATE: 18 BRPM | HEART RATE: 81 BPM | OXYGEN SATURATION: 98 % | TEMPERATURE: 98 F | SYSTOLIC BLOOD PRESSURE: 122 MMHG | DIASTOLIC BLOOD PRESSURE: 70 MMHG | WEIGHT: 170 LBS

## 2025-01-21 DIAGNOSIS — K12.0 APHTHOUS ULCER: Primary | ICD-10-CM

## 2025-01-21 LAB
CONTROL LINE PRESENT WITH A CLEAR BACKGROUND (YES/NO): YES YES/NO
KIT LOT #: NORMAL NUMERIC
OPERATOR ID: NORMAL
POCT LOT NUMBER: NORMAL
RAPID SARS-COV-2 BY PCR: NOT DETECTED
STREP GRP A CUL-SCR: NEGATIVE

## 2025-01-21 PROCEDURE — U0002 COVID-19 LAB TEST NON-CDC: HCPCS | Performed by: NURSE PRACTITIONER

## 2025-01-21 PROCEDURE — 3074F SYST BP LT 130 MM HG: CPT | Performed by: NURSE PRACTITIONER

## 2025-01-21 PROCEDURE — 3078F DIAST BP <80 MM HG: CPT | Performed by: NURSE PRACTITIONER

## 2025-01-21 PROCEDURE — 87880 STREP A ASSAY W/OPTIC: CPT | Performed by: NURSE PRACTITIONER

## 2025-01-21 PROCEDURE — 99213 OFFICE O/P EST LOW 20 MIN: CPT | Performed by: NURSE PRACTITIONER

## 2025-01-21 RX ORDER — ESTRADIOL 0.04 MG/D
PATCH, EXTENDED RELEASE TRANSDERMAL
COMMUNITY
Start: 2024-07-26

## 2025-01-21 RX ORDER — PREDNISONE 20 MG/1
40 TABLET ORAL DAILY
Qty: 10 TABLET | Refills: 0 | Status: SHIPPED | OUTPATIENT
Start: 2025-01-21 | End: 2025-01-26

## 2025-01-21 RX ORDER — ESCITALOPRAM OXALATE 5 MG/1
1 TABLET ORAL DAILY
COMMUNITY

## 2025-01-21 NOTE — PROGRESS NOTES
CHIEF COMPLAINT:     Chief Complaint   Patient presents with    Sore Throat       HPI:   Monalisa Moralez is a 39 year old female who presents for concerns of an apthous ulcer to the back of her throat.  Patient reports symptoms started in the past 48hrs.  Denies any fevers, cough, wheezing, chest discomfort, or shortness of breath. She notes this has happened in the past and responded well when treated with prednisone. Tolerates PO well at home. No n/v/d.  Denies any other aggravating or relieving factors at home. Denies any other treatment attempts prior to arrival.       Current Outpatient Medications   Medication Sig Dispense Refill    VIVELLE-DOT 0.0375 MG/24HR Transdermal Patch Biweekly Apply 1 patch twice a week by transdermal route.      predniSONE 20 MG Oral Tab Take 2 tablets (40 mg total) by mouth daily for 5 days. Take with food. 10 tablet 0    escitalopram 5 MG Oral Tab Take 1 tablet (5 mg total) by mouth daily.      valACYclovir 1 G Oral Tab TAKE 2 TABLETS BY MOUTH EVERY 12 HOURS FOR 2 DOSES AS NEEDED FOR EACH EPISODE OF COLD SORE. START AT ONSET 16 tablet 3    albuterol 108 (90 Base) MCG/ACT Inhalation Aero Soln Inhale 2 puffs into the lungs as needed. 1 each 0    Omega-3 1000 MG Oral Cap Take 1 capsule by mouth As Directed.        Past Medical History:    Allergic rhinitis    Asthma (HCC)    Extrinsic asthma, unspecified    exercise induced      Past Surgical History:   Procedure Laterality Date    Adenoidectomy      Nicki biopsy stereo nodule 1 site right (cpt=19081)  12/2022    fibroadenoma    Removal gallbladder      Sinus surgery        Tonsillectomy           Social History     Socioeconomic History    Marital status:    Tobacco Use    Smoking status: Never    Smokeless tobacco: Never   Vaping Use    Vaping status: Never Used   Substance and Sexual Activity    Alcohol use: Not Currently     Comment: rarely    Drug use: No    Sexual activity: Yes     Partners: Male     Birth  control/protection: Vasectomy   Other Topics Concern    Caffeine Concern Yes     Comment: 8oz cup daily    Exercise No    Seat Belt Yes     Social Drivers of Health      Received from [facility], [facility]    Social Connections    Received from [facility], [facility]    Housing Stability         REVIEW OF SYSTEMS:   GENERAL: Denies fever. Notes good appetite  SKIN: no rashes or abnormal skin lesions  HEENT: + sore throat, + mild sinus symptoms, Denies ear pain  LUNGS: Denies cough, denies shortness of breath or wheezing,   CARDIOVASCULAR: denies chest pain or palpitations   GI: denies N/V/C or abdominal pain  NEURO: Denies headaches    EXAM:   /70   Pulse 81   Temp 98.4 °F (36.9 °C) (Temporal)   Resp 18   Wt 170 lb (77.1 kg)   LMP 01/03/2025 (Exact Date)   SpO2 98%   BMI 25.85 kg/m²   GENERAL: well developed, well nourished,in no apparent distress  SKIN: no rashes,no suspicious lesions  HEAD: atraumatic, normocephalic.    EYES: conjunctiva clear  EARS: TM's intact and without erythema, no bulging, no retraction,no fluid, bony landmarks visualized. No erythema or swelling noted to ear canals or external ears.   NOSE: Nostrils patent, clear nasal mucous, nasal mucosa wnl  THROAT: Oral mucosa pink, moist. Posterior pharynx is mildly erythematous. Small while ulcuer noted to right side of pharynx. No exudates. No tonsillar hypertrophy noted.  No trismus. Uvula midline with no swelling. Voice clear/normal. No stridor  NECK: Supple, non-tender  LUNGS: clear to auscultation bilaterally, no rales, wheezes or rhonchi. Breathing is non labored.  CARDIO: RRR without murmur  EXTREMITIES: no cyanosis, clubbing or edema  LYMPH:  No lymphadenopathy.        ASSESSMENT AND PLAN:       ICD-10-CM    1. Aphthous ulcer  K12.0 Rapid Strep     Rapid Covid-19     predniSONE 20 MG Oral Tab        Covid-19 test negative  Rapid strep negative.      Discussed physical exam and hpi with pt.  Pt has reassuring physical exam consistent with a small apthous ulcer and mild uri symptoms.  No signs of pta or retropharyngeal infection.Lungs clear bilat. No respiratory distress noted. Treatment options discussed with patient and explained in detail. We reviewed symptomatic care at home. Pt notes oral steroids have helped in the past. Notes she prefers 40mg daily dose over medrol dose pack due to past treatment failures with medrol dose pack. The risks, benefits, interactions, and potential side effects of possible medications were reviewed. Alternatives were discussed. Monitoring parameters and expected course outlined. . Patient to call PCP or go to emergency department if symptoms fail to respond as outlined, or worsen in any way. The patient agreed with the plan.    Patient Instructions   1. Rest. Drink plenty of fluids.  2. Prednisone as prescribe.d (Take with food. Please avoid using nsaids like ibuprofen/aleve while taking prednisone).  3. Supportive care as discussed.  4. Use humidifier at home when possible.  5. The rapid strep test is negative.  6. Covid-19 test negative.    7. Follow up with PMD in 4-5 days for re-eval. Go to the emergency department immediately if symptoms worsen, change, you develop chest discomfort, wheezing, shortness of breath, or if you have any concerns.'

## 2025-01-21 NOTE — PATIENT INSTRUCTIONS
1. Rest. Drink plenty of fluids.  2. Prednisone as prescribe.d (Take with food. Please avoid using nsaids like ibuprofen/aleve while taking prednisone).  3. Supportive care as discussed.  4. Use humidifier at home when possible.  5. The rapid strep test is negative.  6. Covid-19 test negative.    7. Follow up with PMD in 4-5 days for re-eval. Go to the emergency department immediately if symptoms worsen, change, you develop chest discomfort, wheezing, shortness of breath, or if you have any concerns.'

## 2025-02-04 LAB — AMB EXT TSH: 1.1 MIU/ML

## 2025-02-27 ENCOUNTER — APPOINTMENT (OUTPATIENT)
Dept: GENERAL RADIOLOGY | Age: 40
End: 2025-02-27
Attending: NURSE PRACTITIONER
Payer: COMMERCIAL

## 2025-02-27 ENCOUNTER — HOSPITAL ENCOUNTER (OUTPATIENT)
Age: 40
Discharge: HOME | End: 2025-02-27
Payer: COMMERCIAL

## 2025-02-27 VITALS
RESPIRATION RATE: 16 BRPM | HEIGHT: 68 IN | SYSTOLIC BLOOD PRESSURE: 114 MMHG | WEIGHT: 170 LBS | HEART RATE: 84 BPM | DIASTOLIC BLOOD PRESSURE: 81 MMHG | OXYGEN SATURATION: 98 % | BODY MASS INDEX: 25.76 KG/M2 | TEMPERATURE: 99 F

## 2025-02-27 DIAGNOSIS — M79.672 LEFT FOOT PAIN: Primary | ICD-10-CM

## 2025-02-27 PROCEDURE — 73630 X-RAY EXAM OF FOOT: CPT | Performed by: NURSE PRACTITIONER

## 2025-02-27 PROCEDURE — 99213 OFFICE O/P EST LOW 20 MIN: CPT | Performed by: NURSE PRACTITIONER

## 2025-02-27 RX ORDER — SERTRALINE HYDROCHLORIDE 25 MG/1
TABLET, FILM COATED ORAL
COMMUNITY
Start: 2025-01-29

## 2025-02-27 RX ORDER — CEPHALEXIN 500 MG/1
500 CAPSULE ORAL 4 TIMES DAILY
Qty: 28 CAPSULE | Refills: 0 | Status: SHIPPED | OUTPATIENT
Start: 2025-02-27 | End: 2025-03-06

## 2025-02-27 RX ORDER — PROGESTERONE 100 MG/1
100 CAPSULE ORAL DAILY
COMMUNITY
Start: 2025-02-06

## 2025-02-27 RX ORDER — ESTRADIOL 0.07 MG/D
1 FILM, EXTENDED RELEASE TRANSDERMAL
COMMUNITY

## 2025-02-27 NOTE — DISCHARGE INSTRUCTIONS
We did an x-ray today which did not show any bony abnormalities.  The redness may be due to inflammation, or may be early infection.  It is not clear at this time if it is infected however.  I did prescribe antibiotics in case this is a skin infection.  Please take the antibiotics as prescribed.  I also recommend over-the-counter ibuprofen 600 mg every 6 hours as needed for pain.  Call your primary care doctor on Monday to arrange a follow-up appointment.  If the foot pain persists, I recommend calling the podiatrist to be evaluated.

## 2025-02-27 NOTE — ED PROVIDER NOTES
Patient Seen in: [facility]      History     Chief Complaint   Patient presents with    Foot Pain     Stated Complaint: L FOOT ISSUE    Subjective:   39-year-old female who woke up this morning with pain and redness to the side of the left foot, by the metatarsal.  Denies any injury or trauma.              Objective:     Past Medical History:    Allergic rhinitis    Asthma (HCC)    Extrinsic asthma, unspecified    exercise induced              Past Surgical History:   Procedure Laterality Date    Adenoidectomy      Nicki biopsy stereo nodule 1 site right (cpt=19081)  12/2022    fibroadenoma    Removal gallbladder      Sinus surgery        Tonsillectomy                  Social History     Socioeconomic History    Marital status:    Tobacco Use    Smoking status: Never    Smokeless tobacco: Never   Vaping Use    Vaping status: Never Used   Substance and Sexual Activity    Alcohol use: Not Currently     Comment: rarely    Drug use: No    Sexual activity: Yes     Partners: Male     Birth control/protection: Vasectomy   Other Topics Concern    Caffeine Concern Yes     Comment: 8oz cup daily    Exercise No    Seat Belt Yes     Social Drivers of Health     Food Insecurity: No Food Insecurity (1/25/2025)    Received from [facility]    Food Insecurity     Currently or in the past 3 months, have you worried your food would run out before you had money to buy more?: No     In the past 12 months, have you run out of food or been unable to get more?: No   Transportation Needs: No Transportation Needs (1/25/2025)    Received from [facility]    Transportation Needs     Currently or in the past 3 months, has lack of transportation kept you from medical appointments, getting food or medicine, or providing care to a family member?: Unrecognized value     Medical Transportation Needs?: No    Received from [facility], [facility]    Housing  Stability              Review of Systems   Constitutional: Negative.    Musculoskeletal:         Left foot pain, redness, swelling   All other systems reviewed and are negative.      Positive for stated complaint: L FOOT ISSUE  Other systems are as noted in HPI.  Constitutional and vital signs reviewed.      All other systems reviewed and negative except as noted above.    Physical Exam     ED Triage Vitals [02/27/25 0917]   /81   Pulse 84   Resp 16   Temp 98.6 °F (37 °C)   Temp src Oral   SpO2 98 %   O2 Device None (Room air)       Current Vitals:   Vital Signs  BP: 114/81  Pulse: 84  Resp: 16  Temp: 98.6 °F (37 °C)  Temp src: Oral    Oxygen Therapy  SpO2: 98 %  O2 Device: None (Room air)        Physical Exam  Vitals and nursing note reviewed.   Constitutional:       General: She is not in acute distress.     Appearance: Normal appearance. She is not ill-appearing, toxic-appearing or diaphoretic.   Pulmonary:      Effort: No respiratory distress.   Musculoskeletal:      Comments: Left foot exam is normal except for small area to the side of the left foot by the fifth metatarsal at that is erythematous, although not warm.  No streaking lesions.  No open wound.  There is no bony tenderness.   Skin:     General: Skin is warm and dry.      Capillary Refill: Capillary refill takes less than 2 seconds.      Coloration: Skin is not jaundiced or pale.   Neurological:      General: No focal deficit present.      Mental Status: She is alert and oriented to person, place, and time.   Psychiatric:         Mood and Affect: Mood normal.         Behavior: Behavior normal.             ED Course   Labs Reviewed - No data to display  XR FOOT, COMPLETE (MIN 3 VIEWS), LEFT (CPT=73630)    Result Date: 2/27/2025  PROCEDURE:  XR FOOT, COMPLETE (MIN 3 VIEWS), LEFT (CPT=73630)  TECHNIQUE:  AP, oblique, and lateral views were obtained.  COMPARISON:  [facility], XR, XR FOOT, COMPLETE (MIN 3 VIEWS), LEFT (CPT=73630),  11/24/2021, 11:02 AM.  INDICATIONS:  L FOOT ISSUE  PATIENT STATED HISTORY: (As transcribed by Technologist)  Patient states she woke up with pain and redness to left foot at lateral base of 5th toe/distal metatarsal. Denies any injury.    FINDINGS:  BONES:  No acute fracture dislocation mild degenerative changes are present SOFT TISSUES:  Negative.  No visible soft tissue swelling. EFFUSION:  None visible. OTHER:  Negative.            CONCLUSION:  No acute fracture or dislocation.   LOCATION:  [facility]   Dictated by (CST): Asim Diaz MD on 2/27/2025 at 9:54 AM     Finalized by (CST): Asim Diaz MD on 2/27/2025 at 9:54 AM                    MDM              Medical Decision Making  Nontoxic adult female patient with a small area that is erythematous to the side of the left foot on the dorsum to the distal fifth metatarsal region.  No bony tenderness.  No vascular deficits.  No petechiae.  No obvious deformity.  Differential diagnosis considered include fracture, cellulitis, inflammation.  Will obtain x-ray.    I personally viewed, independently interpreted and radiology report was reviewed.  No fracture.    Supportive/home management of diagnosis/illness/injury discussed. Red flag symptoms discussed.  Signs and symptoms/criteria that would necessitate reevaluation, including ER evaluation discussed.  Patient and/or responsible adult verbalize and agree with management and plan of care.    Speech recognition software was used during this dictation.  There may be minor errors in transcription.          Amount and/or Complexity of Data Reviewed  Radiology: ordered and independent interpretation performed. Decision-making details documented in ED Course.        Disposition and Plan     Clinical Impression:  1. Left foot pain         Disposition:  Discharge  2/27/2025 10:04 am    Follow-up:  Elma Glover, DPM  [address] [place] [id number]  [phone]      Podiatry  recommendation          Medications Prescribed:  Current Discharge Medication List        START taking these medications    Details   cephALEXin 500 MG Oral Cap Take 1 capsule (500 mg total) by mouth 4 (four) times daily for 7 days.  Qty: 28 capsule, Refills: 0                 Supplementary Documentation:

## 2025-08-01 ENCOUNTER — OFFICE VISIT (OUTPATIENT)
Dept: FAMILY MEDICINE CLINIC | Facility: CLINIC | Age: 40
End: 2025-08-01

## 2025-08-01 ENCOUNTER — LAB ENCOUNTER (OUTPATIENT)
Dept: LAB | Age: 40
End: 2025-08-01
Attending: NURSE PRACTITIONER

## 2025-08-01 VITALS
WEIGHT: 175 LBS | BODY MASS INDEX: 25.92 KG/M2 | DIASTOLIC BLOOD PRESSURE: 68 MMHG | HEART RATE: 68 BPM | SYSTOLIC BLOOD PRESSURE: 104 MMHG | TEMPERATURE: 97 F | OXYGEN SATURATION: 99 % | HEIGHT: 69 IN

## 2025-08-01 DIAGNOSIS — Z79.890 HORMONE REPLACEMENT THERAPY (HRT): ICD-10-CM

## 2025-08-01 DIAGNOSIS — E04.1 THYROID NODULE: ICD-10-CM

## 2025-08-01 DIAGNOSIS — Z12.31 ENCOUNTER FOR SCREENING MAMMOGRAM FOR MALIGNANT NEOPLASM OF BREAST: ICD-10-CM

## 2025-08-01 DIAGNOSIS — N95.1 PERIMENOPAUSAL: ICD-10-CM

## 2025-08-01 DIAGNOSIS — R92.333 HETEROGENEOUSLY DENSE TISSUE OF BOTH BREASTS ON MAMMOGRAPHY: ICD-10-CM

## 2025-08-01 DIAGNOSIS — L98.9 SKIN LESION: ICD-10-CM

## 2025-08-01 DIAGNOSIS — Z76.89 ENCOUNTER TO ESTABLISH CARE WITH NEW PROVIDER: Primary | ICD-10-CM

## 2025-08-01 DIAGNOSIS — J45.990 EXERCISE-INDUCED ASTHMA (HCC): ICD-10-CM

## 2025-08-01 LAB
ALBUMIN SERPL-MCNC: 4.8 G/DL (ref 3.2–4.8)
ALBUMIN/GLOB SERPL: 1.8 (ref 1–2)
ALP LIVER SERPL-CCNC: 62 U/L (ref 37–98)
ALT SERPL-CCNC: 42 U/L (ref 10–49)
ANION GAP SERPL CALC-SCNC: 6 MMOL/L (ref 0–18)
AST SERPL-CCNC: 31 U/L (ref ?–34)
BILIRUB SERPL-MCNC: 0.5 MG/DL (ref 0.3–1.2)
BUN BLD-MCNC: 9 MG/DL (ref 9–23)
CALCIUM BLD-MCNC: 9.3 MG/DL (ref 8.7–10.6)
CHLORIDE SERPL-SCNC: 106 MMOL/L (ref 98–112)
CHOLEST SERPL-MCNC: 129 MG/DL (ref ?–200)
CO2 SERPL-SCNC: 28 MMOL/L (ref 21–32)
CREAT BLD-MCNC: 0.79 MG/DL (ref 0.55–1.02)
CRP SERPL-MCNC: <0.5 MG/DL (ref ?–0.5)
EGFRCR SERPLBLD CKD-EPI 2021: 98 ML/MIN/1.73M2 (ref 60–?)
ERYTHROCYTE [SEDIMENTATION RATE] IN BLOOD: 3 MM/HR (ref 0–20)
FASTING PATIENT LIPID ANSWER: YES
FASTING STATUS PATIENT QL REPORTED: YES
GLOBULIN PLAS-MCNC: 2.7 G/DL (ref 2–3.5)
GLUCOSE BLD-MCNC: 95 MG/DL (ref 70–99)
HDLC SERPL-MCNC: 54 MG/DL (ref 40–59)
LDLC SERPL CALC-MCNC: 63 MG/DL (ref ?–100)
NONHDLC SERPL-MCNC: 75 MG/DL (ref ?–130)
OSMOLALITY SERPL CALC.SUM OF ELEC: 288 MOSM/KG (ref 275–295)
POTASSIUM SERPL-SCNC: 5 MMOL/L (ref 3.5–5.1)
PROT SERPL-MCNC: 7.5 G/DL (ref 5.7–8.2)
SODIUM SERPL-SCNC: 140 MMOL/L (ref 136–145)
T4 FREE SERPL-MCNC: 1.3 NG/DL (ref 0.8–1.7)
TESTOST SERPL-MCNC: 19.93 NG/DL (ref 8.38–35.01)
TRIGL SERPL-MCNC: 57 MG/DL (ref 30–149)
TSI SER-ACNC: 1.12 UIU/ML (ref 0.55–4.78)
VLDLC SERPL CALC-MCNC: 8 MG/DL (ref 0–30)

## 2025-08-01 PROCEDURE — 86140 C-REACTIVE PROTEIN: CPT | Performed by: NURSE PRACTITIONER

## 2025-08-01 PROCEDURE — 84443 ASSAY THYROID STIM HORMONE: CPT | Performed by: NURSE PRACTITIONER

## 2025-08-01 PROCEDURE — 80053 COMPREHEN METABOLIC PANEL: CPT | Performed by: NURSE PRACTITIONER

## 2025-08-01 PROCEDURE — 84439 ASSAY OF FREE THYROXINE: CPT | Performed by: NURSE PRACTITIONER

## 2025-08-01 PROCEDURE — 84403 ASSAY OF TOTAL TESTOSTERONE: CPT | Performed by: NURSE PRACTITIONER

## 2025-08-01 PROCEDURE — 80061 LIPID PANEL: CPT | Performed by: NURSE PRACTITIONER

## 2025-08-01 PROCEDURE — 85652 RBC SED RATE AUTOMATED: CPT | Performed by: NURSE PRACTITIONER

## 2025-08-14 ENCOUNTER — PATIENT MESSAGE (OUTPATIENT)
Dept: FAMILY MEDICINE CLINIC | Facility: CLINIC | Age: 40
End: 2025-08-14

## 2025-08-14 DIAGNOSIS — R09.89 TONSIL PAIN: Primary | ICD-10-CM

## 2025-08-19 ENCOUNTER — HOSPITAL ENCOUNTER (OUTPATIENT)
Dept: MAMMOGRAPHY | Age: 40
Discharge: HOME | End: 2025-08-19
Attending: NURSE PRACTITIONER

## 2025-08-19 DIAGNOSIS — R92.333 HETEROGENEOUSLY DENSE TISSUE OF BOTH BREASTS ON MAMMOGRAPHY: ICD-10-CM

## 2025-08-19 PROCEDURE — 77063 BREAST TOMOSYNTHESIS BI: CPT | Performed by: NURSE PRACTITIONER

## 2025-08-19 PROCEDURE — 77067 SCR MAMMO BI INCL CAD: CPT | Performed by: NURSE PRACTITIONER

## 2025-08-20 ENCOUNTER — OFFICE VISIT (OUTPATIENT)
Dept: FAMILY MEDICINE CLINIC | Facility: CLINIC | Age: 40
End: 2025-08-20

## 2025-08-20 VITALS
WEIGHT: 170 LBS | TEMPERATURE: 98 F | DIASTOLIC BLOOD PRESSURE: 60 MMHG | BODY MASS INDEX: 25.18 KG/M2 | SYSTOLIC BLOOD PRESSURE: 112 MMHG | HEIGHT: 69 IN | HEART RATE: 85 BPM | OXYGEN SATURATION: 98 %

## 2025-08-20 DIAGNOSIS — Z86.79 HISTORY OF SUPRAVENTRICULAR TACHYCARDIA: ICD-10-CM

## 2025-08-20 DIAGNOSIS — J39.2 THROAT DISORDER: Primary | ICD-10-CM

## 2025-08-20 DIAGNOSIS — N94.89 VAGINAL BURNING: ICD-10-CM

## 2025-08-20 DIAGNOSIS — F90.2 ATTENTION DEFICIT HYPERACTIVITY DISORDER (ADHD), COMBINED TYPE: ICD-10-CM

## 2025-08-20 DIAGNOSIS — J34.2 NASAL SEPTAL DEVIATION: ICD-10-CM

## 2025-08-20 DIAGNOSIS — F42.2 MIXED OBSESSIONAL THOUGHTS AND ACTS: ICD-10-CM

## 2025-08-20 DIAGNOSIS — J30.1 CHRONIC SEASONAL ALLERGIC RHINITIS DUE TO POLLEN: ICD-10-CM

## 2025-08-20 DIAGNOSIS — Z12.4 CERVICAL CANCER SCREENING: ICD-10-CM

## 2025-08-20 LAB
ATRIAL RATE: 74 BPM
P AXIS: 44 DEGREES
P-R INTERVAL: 134 MS
Q-T INTERVAL: 394 MS
QRS DURATION: 88 MS
QTC CALCULATION (BEZET): 437 MS
R AXIS: 54 DEGREES
T AXIS: 43 DEGREES
VENTRICULAR RATE: 74 BPM

## 2025-08-20 PROCEDURE — 88175 CYTOPATH C/V AUTO FLUID REDO: CPT | Performed by: NURSE PRACTITIONER

## 2025-08-20 PROCEDURE — 81514 NFCT DS BV&VAGINITIS DNA ALG: CPT | Performed by: NURSE PRACTITIONER

## 2025-08-20 PROCEDURE — 87624 HPV HI-RISK TYP POOLED RSLT: CPT | Performed by: NURSE PRACTITIONER

## 2025-08-20 RX ORDER — DEXTROAMPHETAMINE SACCHARATE, AMPHETAMINE ASPARTATE MONOHYDRATE, DEXTROAMPHETAMINE SULFATE AND AMPHETAMINE SULFATE 2.5; 2.5; 2.5; 2.5 MG/1; MG/1; MG/1; MG/1
10 CAPSULE, EXTENDED RELEASE ORAL EVERY MORNING
Qty: 30 CAPSULE | Refills: 0 | Status: SHIPPED | OUTPATIENT
Start: 2025-08-20 | End: 2025-09-19

## 2025-08-21 ENCOUNTER — RESULTS FOLLOW-UP (OUTPATIENT)
Dept: FAMILY MEDICINE CLINIC | Facility: CLINIC | Age: 40
End: 2025-08-21

## 2025-08-21 ENCOUNTER — HOSPITAL ENCOUNTER (OUTPATIENT)
Dept: MAMMOGRAPHY | Age: 40
Discharge: HOME | End: 2025-08-21
Attending: NURSE PRACTITIONER

## 2025-08-21 DIAGNOSIS — R92.2 INCONCLUSIVE MAMMOGRAM: ICD-10-CM

## 2025-08-21 LAB
BV BACTERIA DNA VAG QL NAA+PROBE: NEGATIVE
C GLABRATA DNA VAG QL NAA+PROBE: NEGATIVE
C KRUSEI DNA VAG QL NAA+PROBE: NEGATIVE
CANDIDA DNA VAG QL NAA+PROBE: NEGATIVE
HPV E6+E7 MRNA CVX QL NAA+PROBE: NEGATIVE
T VAGINALIS DNA VAG QL NAA+PROBE: NEGATIVE

## 2025-08-21 PROCEDURE — 77066 DX MAMMO INCL CAD BI: CPT | Performed by: NURSE PRACTITIONER

## 2025-08-21 PROCEDURE — 77062 BREAST TOMOSYNTHESIS BI: CPT | Performed by: NURSE PRACTITIONER

## 2025-08-23 ENCOUNTER — TELEPHONE (OUTPATIENT)
Age: 40
End: 2025-08-23

## 2025-08-29 ENCOUNTER — PATIENT MESSAGE (OUTPATIENT)
Dept: FAMILY MEDICINE CLINIC | Facility: CLINIC | Age: 40
End: 2025-08-29

## (undated) NOTE — MR AVS SNAPSHOT
[address] [address]  [phone]               Thank you for choosing us for your health care visit with Valeria Rueda MD.  We are glad to serve you and happy to provide you with this sum clinic staff will provide you with the phone number you should call to schedule your appointment.      If you are confident that your benefit plan will not require a referral or authorization, such as [place], please feel free to schedule your raheel acyclovir 5 % Oint   Apply to affected area 5x/day for 5 days prn for cold sore   Commonly known as:  ZOVIRAX           Diclofenac Sodium 1 % Gel   FRANCESCA 2 GRAMS EXT AA QID   Commonly known as:  VOLTAREN           Fluticasone Propionate 50 MCG/ACT Susp   by

## (undated) NOTE — MR AVS SNAPSHOT
[address] [address]  [phone]               Thank you for choosing us for your health care visit with Drew Hussein MD.  We are glad to serve you and happy to provide you with this sum These medications were sent to [place] [address] [address] [place] [address] [address] [place] [address] ([address]), [phone], [phone] [address] [facility] [address]     Phone:  [phone]    - Phentermine HCl 30 MG Caps

## (undated) NOTE — LETTER
ASTHMA ACTION PLAN for Mimi Abreu     : 3/27/1985     Date: 19  Doctor:  Jeanie Chu MD  Phone for doctor or clinic: [facility] [place] [address] [phone] or call 911 immediately! If symptoms improve, call office for appointment immediately.     Albuterol inhaler 2 puffs every 20 minutes for three treatments       Don't forget:  · Rinse mouth after using inhaler  · Use spacer for inhaler  · Remember to get yo

## (undated) NOTE — ED AVS SNAPSHOT
[facility] [address] [address]    Phone:  [phone]    Fax:  [phone]   MRN: [id number]    Department:  [facility] [place]   Date of Visit:  3/5/2017           Diagno Discharge References/Attachments     CONJUNCTIVITIS, NON-SPECIFIC (ENGLISH)    SINUSITIS, CHRONIC (ENGLISH)      Disclosure     Insurance plans vary and the physician(s) referred by the [facility] may not be covered by your plan.  Please contact your in IF THERE IS ANY CHANGE OR WORSENING OF YOUR CONDITION, CALL YOUR PRIMARY CARE PHYSICIAN AT ONCE OR GO TO THE EMERGENCY DEPARTMENT.     If you have been prescribed any medication(s), please fill your prescription right away and begin taking the medication(s) Patient [address] to help you get signed up for insurance coverage. Patient [address] is a Federal Navigator program that can help with your Affordable Care Act coverage, as well as all types of Medicaid plans.   To get signed up and covere

## (undated) NOTE — LETTER
Date: 2/3/2020    Patient Name: Kayy Finnegan  : 3/27/1985      To Whom it may concern:    I am requesting phentermine approval for patient.   She had taken it previously and it had helped with weight loss, but we took a break from it for ~8 months and ar

## (undated) NOTE — Clinical Note
ASTHMA ACTION PLAN for Dc Gray     : 3/27/1985     Date: 2017  Doctor:  Avi Fonseca MD  Phone for doctor or clinic: [facility] [place] [address]  [address]  [phone] · Remember to get your Flu vaccine every fall! Asthma Action Plan reviewed with the caregiver and patient, and a copy of the plan was given to the patient/caregiver.     Asthma Action Plan reviewed with the caregiver and patient on the phone, and copy m